# Patient Record
Sex: FEMALE | Race: WHITE | NOT HISPANIC OR LATINO | Employment: FULL TIME | URBAN - METROPOLITAN AREA
[De-identification: names, ages, dates, MRNs, and addresses within clinical notes are randomized per-mention and may not be internally consistent; named-entity substitution may affect disease eponyms.]

---

## 2022-05-04 ENCOUNTER — CLINICAL SUPPORT (OUTPATIENT)
Dept: OTHER | Facility: CLINIC | Age: 62
End: 2022-05-04
Payer: COMMERCIAL

## 2022-05-04 DIAGNOSIS — Z00.8 ENCOUNTER FOR OTHER GENERAL EXAMINATION: ICD-10-CM

## 2022-05-04 PROCEDURE — 99401 PR PREVENT COUNSEL,INDIV,15 MIN: ICD-10-PCS | Mod: S$GLB,,, | Performed by: INTERNAL MEDICINE

## 2022-05-04 PROCEDURE — 80061 PR  LIPID PANEL: ICD-10-PCS | Mod: QW,S$GLB,, | Performed by: INTERNAL MEDICINE

## 2022-05-04 PROCEDURE — 99401 PREV MED CNSL INDIV APPRX 15: CPT | Mod: S$GLB,,, | Performed by: INTERNAL MEDICINE

## 2022-05-04 PROCEDURE — 80061 LIPID PANEL: CPT | Mod: QW,S$GLB,, | Performed by: INTERNAL MEDICINE

## 2022-05-04 PROCEDURE — 82947 ASSAY GLUCOSE BLOOD QUANT: CPT | Mod: QW,S$GLB,, | Performed by: INTERNAL MEDICINE

## 2022-05-04 PROCEDURE — 82947 PR  ASSAY QUANTITATIVE,BLOOD GLUCOSE: ICD-10-PCS | Mod: QW,S$GLB,, | Performed by: INTERNAL MEDICINE

## 2022-05-05 VITALS
BODY MASS INDEX: 26.22 KG/M2 | WEIGHT: 148 LBS | DIASTOLIC BLOOD PRESSURE: 82 MMHG | SYSTOLIC BLOOD PRESSURE: 126 MMHG | HEIGHT: 63 IN

## 2022-05-05 LAB
HDLC SERPL-MCNC: 64 MG/DL
POC CHOLESTEROL, LDL (DOCK): 88.27 MG/DL
POC CHOLESTEROL, TOTAL: 170 MG/DL
POC GLUCOSE, FASTING: 97 MG/DL (ref 60–110)
TRIGL SERPL-MCNC: 98 MG/DL

## 2023-05-16 ENCOUNTER — OFFICE VISIT (OUTPATIENT)
Dept: UROGYNECOLOGY | Facility: CLINIC | Age: 63
End: 2023-05-16
Payer: COMMERCIAL

## 2023-05-16 VITALS
DIASTOLIC BLOOD PRESSURE: 74 MMHG | HEIGHT: 63 IN | SYSTOLIC BLOOD PRESSURE: 122 MMHG | BODY MASS INDEX: 23.77 KG/M2 | WEIGHT: 134.13 LBS

## 2023-05-16 DIAGNOSIS — N39.41 URGE INCONTINENCE: ICD-10-CM

## 2023-05-16 DIAGNOSIS — N81.11 CYSTOCELE, MIDLINE: Primary | ICD-10-CM

## 2023-05-16 DIAGNOSIS — R39.15 URINARY URGENCY: ICD-10-CM

## 2023-05-16 DIAGNOSIS — N81.9 VAGINAL VAULT PROLAPSE: ICD-10-CM

## 2023-05-16 LAB — POC RESIDUAL URINE VOLUME: 91 ML (ref 0–100)

## 2023-05-16 PROCEDURE — 99999 PR PBB SHADOW E&M-EST. PATIENT-LVL III: ICD-10-PCS | Mod: PBBFAC,,, | Performed by: OBSTETRICS & GYNECOLOGY

## 2023-05-16 PROCEDURE — 99999 PR PBB SHADOW E&M-EST. PATIENT-LVL III: CPT | Mod: PBBFAC,,, | Performed by: OBSTETRICS & GYNECOLOGY

## 2023-05-16 PROCEDURE — 1160F RVW MEDS BY RX/DR IN RCRD: CPT | Mod: CPTII,S$GLB,, | Performed by: OBSTETRICS & GYNECOLOGY

## 2023-05-16 PROCEDURE — 1159F PR MEDICATION LIST DOCUMENTED IN MEDICAL RECORD: ICD-10-PCS | Mod: CPTII,S$GLB,, | Performed by: OBSTETRICS & GYNECOLOGY

## 2023-05-16 PROCEDURE — 3074F SYST BP LT 130 MM HG: CPT | Mod: CPTII,S$GLB,, | Performed by: OBSTETRICS & GYNECOLOGY

## 2023-05-16 PROCEDURE — 3074F PR MOST RECENT SYSTOLIC BLOOD PRESSURE < 130 MM HG: ICD-10-PCS | Mod: CPTII,S$GLB,, | Performed by: OBSTETRICS & GYNECOLOGY

## 2023-05-16 PROCEDURE — 99204 PR OFFICE/OUTPT VISIT, NEW, LEVL IV, 45-59 MIN: ICD-10-PCS | Mod: S$GLB,,, | Performed by: OBSTETRICS & GYNECOLOGY

## 2023-05-16 PROCEDURE — 99204 OFFICE O/P NEW MOD 45 MIN: CPT | Mod: S$GLB,,, | Performed by: OBSTETRICS & GYNECOLOGY

## 2023-05-16 PROCEDURE — 3078F PR MOST RECENT DIASTOLIC BLOOD PRESSURE < 80 MM HG: ICD-10-PCS | Mod: CPTII,S$GLB,, | Performed by: OBSTETRICS & GYNECOLOGY

## 2023-05-16 PROCEDURE — 3008F PR BODY MASS INDEX (BMI) DOCUMENTED: ICD-10-PCS | Mod: CPTII,S$GLB,, | Performed by: OBSTETRICS & GYNECOLOGY

## 2023-05-16 PROCEDURE — 3008F BODY MASS INDEX DOCD: CPT | Mod: CPTII,S$GLB,, | Performed by: OBSTETRICS & GYNECOLOGY

## 2023-05-16 PROCEDURE — 3078F DIAST BP <80 MM HG: CPT | Mod: CPTII,S$GLB,, | Performed by: OBSTETRICS & GYNECOLOGY

## 2023-05-16 PROCEDURE — 1160F PR REVIEW ALL MEDS BY PRESCRIBER/CLIN PHARMACIST DOCUMENTED: ICD-10-PCS | Mod: CPTII,S$GLB,, | Performed by: OBSTETRICS & GYNECOLOGY

## 2023-05-16 PROCEDURE — 1159F MED LIST DOCD IN RCRD: CPT | Mod: CPTII,S$GLB,, | Performed by: OBSTETRICS & GYNECOLOGY

## 2023-05-16 RX ORDER — LINACLOTIDE 145 UG/1
145 CAPSULE, GELATIN COATED ORAL EVERY MORNING
COMMUNITY
Start: 2023-02-28

## 2023-05-16 NOTE — PROGRESS NOTES
Chief Complaint   Patient presents with    Consult    Vaginal Prolapse        HPI: Patient is a 63 y.o. female  who presents today stating that she has a transvaginal sling with mesh to prevent leakage of urine in  performed in [facility], [place], [place] Urologist. States that this last Saturday she strained a little and when she wiped she noticed a bulge. She reports that she mainly notices it when she is sitting on the toilet. It protrudes past the vaginal opening and is about a golf ball in size. She denies needing to splint with urination and BM's.     She reports that she as decreased sensation since her sling was placed. She states that she only has urinary urgency when she sits for a long period of time. She denies urinary frequency. She denies stress urinary incontinence with coughing, sneezing and laughing. May occasionally have urge incontinence. She denies nocturia and enuresis. She drinks water 70-80 ounces and one large cup of coffee (12 ounces).     She reports a h/o constipation. Takes Linzess and has been on the medication since about . BM's are regular as long as she takes the Linzess. She denies accidental bowel leakage.     Review of Systems   Constitutional:  Negative for activity change, appetite change, chills, fatigue, fever and unexpected weight change.   HENT:  Negative for nasal congestion, dental problem, hearing loss, mouth dryness and sore throat.    Eyes:  Negative for pain and eye dryness.   Respiratory:  Negative for cough, shortness of breath and wheezing.    Cardiovascular:  Negative for chest pain, palpitations and leg swelling.   Gastrointestinal:  Negative for abdominal distention, abdominal pain, blood in stool, constipation and rectal pain.   Endocrine: Negative for cold intolerance and heat intolerance.   Genitourinary:  Negative for dyspareunia, hot flashes and vaginal dryness.   Musculoskeletal:  Negative for arthralgias, back pain, gait problem,  joint swelling, myalgias, neck pain and neck stiffness.   Integumentary:  Negative for rash.   Neurological:  Negative for dizziness, tremors, seizures, speech difficulty, weakness, light-headedness, numbness and headaches.   Psychiatric/Behavioral:  Negative for confusion, dysphoric mood and sleep disturbance. The patient is not nervous/anxious.       Past Medical History:   Diagnosis Date    Diverticulitis        Past Surgical History:   Procedure Laterality Date    HYSTERECTOMY      with BSO    TUBAL LIGATION      vaginal sling      for ZA         Current Outpatient Medications:     LINZESS 145 mcg Cap capsule, Take 145 mcg by mouth every morning., Disp: , Rfl:     estrogens, conjugated, (PREMARIN) 1.25 MG tablet, Take 1.25 mg by mouth once daily., Disp: , Rfl:     oxycodone-acetaminophen 5-325 mg (PERCOCET) 5-325 mg per tablet, Take 1 tablet by mouth every 6 (six) hours as needed for Pain (breakthrough pain). (Patient not taking: Reported on 2023), Disp: 10 tablet, Rfl: 0    Review of patient's allergies indicates:  No Known Allergies    Family History   Problem Relation Age of Onset    Diabetes Mother     Hypertension Mother     Heart failure Father     Hypertension Sister     Hypertension Sister        Social History     Socioeconomic History    Marital status: Single   Occupational History    Occupation:    Tobacco Use    Smoking status: Never    Smokeless tobacco: Never   Substance and Sexual Activity    Alcohol use: Yes     Comment: occ    Drug use: Never    Sexual activity: Yes     Partners: Male   Social History Narrative    Lives with significant other, Carlton. Together since . Feels safe in her home and relationship.        OB History          3    Para   2    Term   2            AB   1    Living   2         SAB   1    IAB        Ectopic        Multiple        Live Births   2                 Gyn History    Mammogram: 23 pending  Pap smear: s/p hysterectomy  LMP:  No LMP recorded.   Postmenopausal bleeding: s/p hysterectomy      INITIAL PHYSICAL EXAMINATION    Vitals:    05/16/23 1337   BP: 122/74      General: Healthy in appearance, Well nourished, Affect Normal, NAD.  Neck: Supple, No masses, Trachea midline, Thyroid normal size,  non-tender, no masses or nodules.  Nodes: No clavicular/cervical adenopathy.  Skin: Normal temperature, No atypical lesions or rash.  Heart: Normal sounds, no murmurs  Lungs: Normal respiratory effort.  Gastrointestinal: Non tender, Non distended, No masses, guarding or  rebound, No hepatosplenomegally, No hernia.  Ext: No clubbing, cyanosis, edema or varicosities.  DTR's: 2+ bilaterally  Strength 5/5 bilateral upper and lower extremities    Genitourinary-  Vulva: normal without lesions, masses, atrophy or pain  Urethra: meatus central and normal in appearance, no prolapse/caruncle, no masses or discharge. Empty supine stress test was negative.  Bladder: non-tender, no masses  Vagina: No discharge or lesions, moderate atrophy, no masses appreciated.  [See POP-Q]  Cervix: absent  Uterus:  absent  Adnexa: no masses, non tender.  Rectal: deferred   Neuro: S2-4- pin prick and light touch intact, Anal wink present  Levator strength: 2/5  Levator tenderness: left 0/10 and right 0/10    POP-Q Exam- pelvic organ prolapse quantitative    Aa +1  Anterior Wall Ba +1  Anterior wall C -5  Cervix or cuff   Gh 5.5  Genital hiatus pb 3  perineal body tvl 10  Total vaginal length   Ap -2  Posterior wall Bp -2  Posterior wall D n/a  Posterior fornix     without Uterus    POP-Q Stage: 2      Office Visit on 05/16/2023   Component Date Value Ref Range Status    POC Residual Urine Volume 05/16/2023 91  0 - 100 mL Final        ASSESSMENT & PLAN:    Cystocele, midline  -     Ambulatory referral/consult to Physical/Occupational Therapy; Future; Expected date: 05/23/2023    Vaginal vault prolapse  -     Ambulatory referral/consult to Physical/Occupational Therapy; Future;  Expected date: 05/23/2023    Urge incontinence    Urinary urgency       Exam findings and treatment options were discussed in detail with the patient. Her symptoms and exam findings are consistent with urge urinary incontinence and pelvic organ prolapse. We discussed various surgical and nonsurgical management options including pessary use, pelvic floor rehabilitation, and reconstructive pelvic surgery. At this time she would prefer a nonsurgical treatment approach and I therefore started her on a conservative regimen as above with pelvic floor PT. She may consider a pessary. Reviewed risk factors for worsening of symptoms such as constipation and straining. She'll be scheduled for a followup appointment at which time further treatment options will be considered if there is not significant improvement in her symptoms. Will call for a sooner appointment if she opts to proceed with a pessary fitting. Out of pocket cost reviewed with her.     All questions were answered today. The patient was encouraged to contact the office as needed with any additional questions or concerns.     Total time spent on visit was 45 minutes.  This includes face to face time and non-face to face time preparing to see the patient (eg, review of tests), Obtaining and/or reviewing separately obtained history, Documenting clinical information in the electronic or other health record, Independently interpreting resultsand communicating results to the patient/family/caregiver, or Care coordination.    Glenna Miguel MD

## 2023-06-01 ENCOUNTER — CLINICAL SUPPORT (OUTPATIENT)
Dept: REHABILITATION | Facility: HOSPITAL | Age: 63
End: 2023-06-01
Attending: OBSTETRICS & GYNECOLOGY
Payer: COMMERCIAL

## 2023-06-01 DIAGNOSIS — R27.9 LACK OF COORDINATION: ICD-10-CM

## 2023-06-01 DIAGNOSIS — N81.89 PELVIC FLOOR WEAKNESS: Primary | ICD-10-CM

## 2023-06-01 DIAGNOSIS — N81.11 CYSTOCELE, MIDLINE: ICD-10-CM

## 2023-06-01 DIAGNOSIS — N81.9 VAGINAL VAULT PROLAPSE: ICD-10-CM

## 2023-06-01 PROCEDURE — 97162 PT EVAL MOD COMPLEX 30 MIN: CPT | Mod: PN

## 2023-06-01 PROCEDURE — 97112 NEUROMUSCULAR REEDUCATION: CPT | Mod: PN

## 2023-06-01 NOTE — PROGRESS NOTES
[facility]   Pelvic Health Physical Therapy Initial Evaluation     Date: 2023   Name: Nicolasa Bean  [facility] Number: [id number]    Therapy Diagnosis:   Encounter Diagnoses   Name Primary?    Cystocele, midline     Vaginal vault prolapse     Pelvic floor weakness Yes    Lack of coordination      Physician: Glenna Miguel MD    Physician Orders: PT Eval and Treat   Medical Diagnosis from Referral: cystocele, midline and vaginal vault prolapse  Evaluation Date: 2023  Authorization Period Expiration: 2023  Plan of Care Expiration: 2023  Progress Note Due: 2023  Visit # / Visits authorized:    FOTO: Issued Visit #: 1     Precautions: Standard    Time In: 11:14  Time Out: 12:20  Total Appointment Time (timed & untimed codes): 66 minutes    SUBJECTIVE     Date of onset: a few weeks ago on Mother's day weekend.     History of current condition - Nicolasa reports: she is having bladder prolapse. Has bulging outside of vaginal opening with going to the bathroom. Is planning to have fixation surgery at the end of the year. Does have a bladder sling that was placed in  and has had a hysterectomy. Since the sling surgery has decreased sensation and has difficulty knowing when she needs to void. Urgency is all or nothing, not able to notice partial fullness. First noticed the prolapse with attempting to have a BM and was bearing down. Has L4-L5 disc issues    OB/GYN History: . Both vaginal births.   Sexually active? Yes  Pain with vaginal exams, intercourse or tampon use? No    Bladder/Bowel History:   Frequency of urination:   Daytime: 5-6 times            Nighttime: none  Difficulty initiating urine stream: No  Urine stream: strong  Complete emptying: No, has not since bladder sling in .   Bladder leakage: Yes, with urgency and prolonged waiting over 4 hours.   Frequency of incidents: not regularly  Amount leaked (urine): few drops and teaspoon(s)  Urinary Urgency:  "Yes, Able to delay the urge for at least a few minute(s).  Frequency of bowel movements: once a day, maybe twice.   Difficulty initiating BM: Yes, sometimes.   Quality/Shape of BM:  starts as firm and then progresses to loose  Does Patient Feel Empty after BM? Yes  Fiber Supplements or Laxative Use? Yes, is taking linzess. Has been taking it for a few years. Has IBS with constipation and diverticulitis.   Colon leakage: No  Frequency of incidents:  none  Amount leaked (bowels):  none  Form of protection: none  Number of pads required in 24 hours: 0    Pain:  Location: started having lower abdominal pain starting yesterday.   Current 0/10, worst 2/10, best 0/10   Description: pressure    Reports feeling a constant pelvic pressure and heaviness. Is only having prolapse bulging with pushing to go to the bathroom.     Prior Therapy: not for this condition  Social History:  lives with their spouse  Current exercise: not currently due to fear of prolapse  Occupation: Patient works  Prior Level of Function: independent  Current Level of Function: independent    Types of fluid intake: 28 ounces of water after taking linzess, 24-30 ounces during the day, a few 16oz bottles at the end of the day, 1 cup of coffee every morning, wine   Diet: no major restrictions, eats a whole diet   Habitus: well developed, well nourished  Abuse/Neglect: No     Patients goals: "get bladder to go back to where it's supposed to be, have exercises to get to the place to have my surgery"     Medical History: Nicolasa  has a past medical history of Diverticulitis.     Surgical History: Nicolasa Bean  has a past surgical history that includes Hysterectomy (2004); Tubal ligation; and vaginal sling.    Medications: Nicolasa has a current medication list which includes the following prescription(s): estrogens (conjugated), linzess, and oxycodone-acetaminophen.    Allergies: Review of patient's allergies indicates:  No Known Allergies     OBJECTIVE     See " EMR under MEDIA for written consent provided 6/1/2023  Chaperone: declined    ORTHO SCREEN  Posture in sitting: sits squarely    Posture in standing: WNL  Pelvic alignment: Not assessed today    SI Joint Palpation: Denies tenderness to SI joint palpation bilaterally.  Sacral spring test: NT (Positive=NO spring)  Adductor Palpation: increased tension     ABDOMINAL WALL ASSESSMENT  Palpation: increased tension to right side near right ASIS  Abdominal strength: Rectus abdominus: 3/5     Transverse abdominus: 3/5  Scarring: none  Pelvic Floor Muscle and Transverse Abdominus Synergy: absent  Diastasis: absent     BREATHING MECHANICS ASSESSMENT   Thorax Assessment During Quiet Respiration: Decreased excursion of abdominal wall   Thorax Assessment During Deep Respiration: Decreased excursion of abdominal wall  and Excessive excursion of lower ribs    VAGINAL PELVIC FLOOR EXAM    EXTERNAL ASSESSMENT  Introitus: gaping  Skin condition: WNL  Scarring: none   Sensation: numbness noted   Pain: none      INTERNAL ASSESSMENT  Pain: tender areas noted as follows: left bulbocavernosus referral to left ASIS and bladder   Sensation: numbness noted throughout    Vaginal vault: roomy   Muscle Bulk: atrophy -1  Muscle Power: 2/5    Quality of contraction: decreased hold and slow relaxation   Specificity: patient contracts: more posterior musculature   Coordination: tends to hold breath during PFM contration   Prolapse check: Grade 2 cystocele in supine      Limitation/Restriction for FOTO Survey    Therapist reviewed FOTO scores for Nicolasa Bean on 6/1/2023.   FOTO documents entered into [employer] - see Media section.    Limitation Score: PFDI Prolapse 8%       TREATMENT     Total Treatment time (time-based codes) separate from Evaluation: 40 minutes     Neuromuscular Re-education to improve Coordination and Control for 40 minutes including:   pelvic floor relaxation/bulging training, diaphragmatic breathing with Kegel, and diaphragmatic  breathing      PATIENT EDUCATION AND HOME EXERCISES     Education provided:   general anatomy/physiology of urinary/ bowel  system and benefits of treatment were discussed with the patient. Additionally, bladder irritants, anatomy/physiology of pelvic floor, bladder retraining, diaphragmatic breathing, kegels, fluid intake/dietary modifications, and behavior modifications were reviewed.     Written Home Exercises provided: yes.  Exercises were reviewed and Nicolasa was able to demonstrate them prior to the end of the session. Nicolasa demonstrated good  understanding of the education provided. See EMR under Patient Instructions for exercises provided during therapy sessions.    ASSESSMENT     Nicolasa is a 63 y.o. female referred to outpatient Physical Therapy with a medical diagnosis of cystocele, midline and vaginal vault prolapse. Pt presents with decreased pelvic floor strength and impaired motor coordination.     Patient prognosis is Good.   Patient will benefit from skilled outpatient Physical Therapy to address the deficits stated above and in the chart below, provide patient/family education, and to maximize patient's level of independence.     Plan of care discussed with patient: Yes  Patient's spiritual, cultural and educational needs considered and patient is agreeable to the plan of care and goals as stated below:     Anticipated Barriers for therapy: scheduling    Medical Necessity is demonstrated by the following:  History  Co-morbidities and personal factors that may impact the plan of care Co-morbidities   prior abdominal surgery and prior pelvic surgery    Personal Factors  no deficits     moderate   Examination  Body structures and functions, activity limitations and participation restrictions that may impact the plan of care Body Regions/Systems/Functions:  decreased pelvic muscle strength, decreased endurance of the pelvic muscles, decreased phasic ability of the pelvic muscles, poor quality of pelvic  muscle contraction, poor coordination of pelvic floor muscles during ADL's leading to urinary or fecal leakage, dysfunctional voiding, dysfunctional defecation, and unable to co-contract or co-relax abdominal wall and pelvic floor muscles     Activity Limitations:  delaying urge to urinate, delaying urge to urinate or have a BM, bearing down for BM, and incontinence with ADLs    Participation Restrictions:  all ADLs/iADLs uninterrupted by urinary incontinence/urgency/frequency, regularly having a comfortable BM, and Pelvic pressure with ADLs.     Activity limitations:   Learning and applying knowledge  no deficits    General Tasks and Commands  no deficits    Communication  no deficits    Mobility  no deficits    Self care  no deficits    Domestic Life  no deficits    Interactions/Relationships  no deficits    Life Areas  no deficits    Community and Social Life  no deficits       moderate   Clinical Presentation evolving clinical presentation with changing clinical characteristics moderate   Decision Making/ Complexity Score: moderate       Goals:  Short Term Goals: 6 weeks   - Pt will demonstrate excellent knowledge and adherence to HEP to facilitate optimal recovery.  - Pt will demonstrate proper PFM contraction, relaxation, and lengthening coordinated with TA and breath for improved muscle coordination needed for functional activity.    Long Term Goals: 12 weeks   - Pt will demonstrate excellent knowledge and adherence to HEP for continued self-maintenance of symptoms.  - Pt will report improvement of prolapse symptoms by 50%.  - Pt will report voiding interval of 2-3 hours for improved ADL tolerance.  - Pt will report no incidence of urinary or fecal incontinence 7/7 days for improved hygiene and ADL/IADL tolerance.   - Pt will demonstrate PFM strength of at least 3/5 MMT for improved strength needed to maintain continence.   - Pt will report bearing down appropriately 100% of the time for improved bowel  function and decreased stress on adjacent pelvic structures.       PLAN     Plan of care Certification: 6/1/2023 to 9/1/2023.    Outpatient Physical Therapy 1-2 time(s) every 1 week(s) for 8-12 weeks to include the following interventions: Manual Therapy, Moist Heat/ Ice, Neuromuscular Re-ed, Patient Education, Therapeutic Activities, and Therapeutic Exercise.     Stephanie Zazueta, PT      I CERTIFY THE NEED FOR THESE SERVICES FURNISHED UNDER THIS PLAN OF TREATMENT AND WHILE UNDER MY CARE   Physician's comments:     Physician's Signature: ___________________________________________________

## 2023-06-02 PROBLEM — N81.11 CYSTOCELE, MIDLINE: Status: ACTIVE | Noted: 2023-06-02

## 2023-06-02 PROBLEM — N81.89 PELVIC FLOOR WEAKNESS: Status: ACTIVE | Noted: 2023-06-02

## 2023-06-02 PROBLEM — N81.9 VAGINAL VAULT PROLAPSE: Status: ACTIVE | Noted: 2023-06-02

## 2023-06-02 PROBLEM — R27.9 LACK OF COORDINATION: Status: ACTIVE | Noted: 2023-06-02

## 2023-06-02 NOTE — PLAN OF CARE
[facility]   Pelvic Health Physical Therapy Initial Evaluation     Date: 2023   Name: Nicolasa Bean  [facility] Number: [id number]    Therapy Diagnosis:   Encounter Diagnoses   Name Primary?    Cystocele, midline     Vaginal vault prolapse     Pelvic floor weakness Yes    Lack of coordination      Physician: Glenna Miguel MD    Physician Orders: PT Eval and Treat   Medical Diagnosis from Referral: cystocele, midline and vaginal vault prolapse  Evaluation Date: 2023  Authorization Period Expiration: 2023  Plan of Care Expiration: 2023  Progress Note Due: 2023  Visit # / Visits authorized:    FOTO: Issued Visit #: 1     Precautions: Standard    Time In: 11:14  Time Out: 12:20  Total Appointment Time (timed & untimed codes): 66 minutes    SUBJECTIVE     Date of onset: a few weeks ago on Mother's day weekend.     History of current condition - Nicolasa reports: she is having bladder prolapse. Has bulging outside of vaginal opening with going to the bathroom. Is planning to have fixation surgery at the end of the year. Does have a bladder sling that was placed in  and has had a hysterectomy. Since the sling surgery has decreased sensation and has difficulty knowing when she needs to void. Urgency is all or nothing, not able to notice partial fullness. First noticed the prolapse with attempting to have a BM and was bearing down. Has L4-L5 disc issues    OB/GYN History: . Both vaginal births.   Sexually active? Yes  Pain with vaginal exams, intercourse or tampon use? No    Bladder/Bowel History:   Frequency of urination:   Daytime: 5-6 times            Nighttime: none  Difficulty initiating urine stream: No  Urine stream: strong  Complete emptying: No, has not since bladder sling in .   Bladder leakage: Yes, with urgency and prolonged waiting over 4 hours.   Frequency of incidents: not regularly  Amount leaked (urine): few drops and teaspoon(s)  Urinary Urgency:  "Yes, Able to delay the urge for at least a few minute(s).  Frequency of bowel movements: once a day, maybe twice.   Difficulty initiating BM: Yes, sometimes.   Quality/Shape of BM: starts as firm and then progresses to loose  Does Patient Feel Empty after BM? Yes  Fiber Supplements or Laxative Use? Yes, is taking linzess. Has been taking it for a few years. Has IBS with constipation and diverticulitis.   Colon leakage: No  Frequency of incidents:  none  Amount leaked (bowels): none  Form of protection: none  Number of pads required in 24 hours: 0    Pain:  Location: started having lower abdominal pain starting yesterday.   Current 0/10, worst 2/10, best 0/10   Description: pressure    Reports feeling a constant pelvic pressure and heaviness. Is only having prolapse bulging with pushing to go to the bathroom.     Prior Therapy: not for this condition  Social History:  lives with their spouse  Current exercise: not currently due to fear of prolapse  Occupation: Patient works  Prior Level of Function: independent  Current Level of Function: independent    Types of fluid intake: 28 ounces of water after taking linzess, 24-30 ounces during the day, a few 16oz bottles at the end of the day, 1 cup of coffee every morning, wine   Diet: no major restrictions, eats a whole diet   Habitus: well developed, well nourished  Abuse/Neglect: No     Patients goals: "get bladder to go back to where it's supposed to be, have exercises to get to the place to have my surgery"     Medical History: Nicolasa  has a past medical history of Diverticulitis.     Surgical History: Nicolasa Bean  has a past surgical history that includes Hysterectomy (2004); Tubal ligation; and vaginal sling.    Medications: Nicolasa has a current medication list which includes the following prescription(s): estrogens (conjugated), linzess, and oxycodone-acetaminophen.    Allergies: Review of patient's allergies indicates:  No Known Allergies     OBJECTIVE     See EMR " under MEDIA for written consent provided 6/1/2023  Chaperone: declined    ORTHO SCREEN  Posture in sitting: sits squarely    Posture in standing: WNL  Pelvic alignment: Not assessed today    SI Joint Palpation: Denies tenderness to SI joint palpation bilaterally.  Sacral spring test: NT (Positive=NO spring)  Adductor Palpation: increased tension     ABDOMINAL WALL ASSESSMENT  Palpation: increased tension to right side near right ASIS  Abdominal strength: Rectus abdominus: 3/5     Transverse abdominus: 3/5  Scarring: none  Pelvic Floor Muscle and Transverse Abdominus Synergy: absent  Diastasis: absent     BREATHING MECHANICS ASSESSMENT   Thorax Assessment During Quiet Respiration: Decreased excursion of abdominal wall   Thorax Assessment During Deep Respiration: Decreased excursion of abdominal wall  and Excessive excursion of lower ribs    VAGINAL PELVIC FLOOR EXAM    EXTERNAL ASSESSMENT  Introitus: gaping  Skin condition: WNL  Scarring: none   Sensation: numbness noted   Pain: none      INTERNAL ASSESSMENT  Pain: tender areas noted as follows: left bulbocavernosus referral to left ASIS and bladder   Sensation: numbness noted throughout   Vaginal vault: roomy   Muscle Bulk: atrophy -1  Muscle Power: 2/5    Quality of contraction: decreased hold and slow relaxation   Specificity: patient contracts: more posterior musculature   Coordination: tends to hold breath during PFM contration   Prolapse check: Grade 2 cystocele in supine      Limitation/Restriction for FOTO Survey    Therapist reviewed FOTO scores for Nicolasa Bean on 6/1/2023.   FOTO documents entered into [employer] - see Media section.    Limitation Score: PFDI Prolapse 8%       TREATMENT     Total Treatment time (time-based codes) separate from Evaluation: 40 minutes     Neuromuscular Re-education to improve Coordination and Control for 40 minutes including:   pelvic floor relaxation/bulging training, diaphragmatic breathing with Kegel, and diaphragmatic  breathing      PATIENT EDUCATION AND HOME EXERCISES     Education provided:   general anatomy/physiology of urinary/ bowel  system and benefits of treatment were discussed with the patient. Additionally, bladder irritants, anatomy/physiology of pelvic floor, bladder retraining, diaphragmatic breathing, kegels, fluid intake/dietary modifications, and behavior modifications were reviewed.     Written Home Exercises provided: yes.  Exercises were reviewed and Nicolasa was able to demonstrate them prior to the end of the session. Nicolasa demonstrated good  understanding of the education provided. See EMR under Patient Instructions for exercises provided during therapy sessions.    ASSESSMENT     Nicolasa is a 63 y.o. female referred to outpatient Physical Therapy with a medical diagnosis of cystocele, midline and vaginal vault prolapse. Pt presents with decreased pelvic floor strength and impaired motor coordination.     Patient prognosis is Good.   Patient will benefit from skilled outpatient Physical Therapy to address the deficits stated above and in the chart below, provide patient/family education, and to maximize patient's level of independence.     Plan of care discussed with patient: Yes  Patient's spiritual, cultural and educational needs considered and patient is agreeable to the plan of care and goals as stated below:     Anticipated Barriers for therapy: scheduling    Medical Necessity is demonstrated by the following:  History  Co-morbidities and personal factors that may impact the plan of care Co-morbidities   prior abdominal surgery and prior pelvic surgery    Personal Factors  no deficits     moderate   Examination  Body structures and functions, activity limitations and participation restrictions that may impact the plan of care Body Regions/Systems/Functions:  decreased pelvic muscle strength, decreased endurance of the pelvic muscles, decreased phasic ability of the pelvic muscles, poor quality of pelvic  muscle contraction, poor coordination of pelvic floor muscles during ADL's leading to urinary or fecal leakage, dysfunctional voiding, dysfunctional defecation, and unable to co-contract or co-relax abdominal wall and pelvic floor muscles     Activity Limitations:  delaying urge to urinate, delaying urge to urinate or have a BM, bearing down for BM, and incontinence with ADLs    Participation Restrictions:  all ADLs/iADLs uninterrupted by urinary incontinence/urgency/frequency, regularly having a comfortable BM, and Pelvic pressure with ADLs.     Activity limitations:   Learning and applying knowledge  no deficits    General Tasks and Commands  no deficits    Communication  no deficits    Mobility  no deficits    Self care  no deficits    Domestic Life  no deficits    Interactions/Relationships  no deficits    Life Areas  no deficits    Community and Social Life  no deficits       moderate   Clinical Presentation evolving clinical presentation with changing clinical characteristics moderate   Decision Making/ Complexity Score: moderate       Goals:  Short Term Goals: 6 weeks   - Pt will demonstrate excellent knowledge and adherence to HEP to facilitate optimal recovery.  - Pt will demonstrate proper PFM contraction, relaxation, and lengthening coordinated with TA and breath for improved muscle coordination needed for functional activity.    Long Term Goals: 12 weeks   - Pt will demonstrate excellent knowledge and adherence to HEP for continued self-maintenance of symptoms.  - Pt will report improvement of prolapse symptoms by 50%.  - Pt will report voiding interval of 2-3 hours for improved ADL tolerance.  - Pt will report no incidence of urinary or fecal incontinence 7/7 days for improved hygiene and ADL/IADL tolerance.   - Pt will demonstrate PFM strength of at least 3/5 MMT for improved strength needed to maintain continence.   - Pt will report bearing down appropriately 100% of the time for improved bowel  function and decreased stress on adjacent pelvic structures.       PLAN     Plan of care Certification: 6/1/2023 to 9/1/2023.    Outpatient Physical Therapy 1-2 time(s) every 1 week(s) for 8-12 weeks to include the following interventions: Manual Therapy, Moist Heat/ Ice, Neuromuscular Re-ed, Patient Education, Therapeutic Activities, and Therapeutic Exercise.     Stephanie Zazueta, PT      I CERTIFY THE NEED FOR THESE SERVICES FURNISHED UNDER THIS PLAN OF TREATMENT AND WHILE UNDER MY CARE   Physician's comments:     Physician's Signature: ___________________________________________________

## 2023-06-02 NOTE — PATIENT INSTRUCTIONS
Kegels - start this laying down so that your pelvic floor doesn't have to work against gravity. Start by taking a deep breath in, then as you exhale draw your pelvic floor closed and lift like you were stopping the flow of urine (close the openings). Hold this contraction at 50-60% of your full force as you exhale. Release to a fully relaxed position. Exhale like you are blowing out birthday candles while performing a Kegel to avoid holding your breath and increasing pressure on your abdomen/pelvic floor  Perform 3 sets of 10 reps, daily    No Kegels while urinating!

## 2023-07-12 ENCOUNTER — CLINICAL SUPPORT (OUTPATIENT)
Dept: REHABILITATION | Facility: HOSPITAL | Age: 63
End: 2023-07-12
Attending: OBSTETRICS & GYNECOLOGY
Payer: COMMERCIAL

## 2023-07-12 DIAGNOSIS — N81.9 VAGINAL VAULT PROLAPSE: Primary | ICD-10-CM

## 2023-07-12 DIAGNOSIS — N81.89 PELVIC FLOOR WEAKNESS: ICD-10-CM

## 2023-07-12 DIAGNOSIS — N81.11 CYSTOCELE, MIDLINE: ICD-10-CM

## 2023-07-12 DIAGNOSIS — R27.9 LACK OF COORDINATION: ICD-10-CM

## 2023-07-12 PROCEDURE — 97530 THERAPEUTIC ACTIVITIES: CPT | Mod: PN

## 2023-07-12 NOTE — PROGRESS NOTES
Pelvic Health Physical Therapy   Treatment Note     Name: Nicolasa Bean  Clinic Number: [id number]    Therapy Diagnosis:   Encounter Diagnoses   Name Primary?    Vaginal vault prolapse Yes    Pelvic floor weakness     Cystocele, midline     Lack of coordination      Physician: Glenna Miguel MD    Visit Date: 7/12/2023    Physician Orders: Physical Therapy evaluate and treat  Medical Diagnosis: cystocele, midline and vaginal vault prolapse  Evaluation Date: 6/1/2023  Authorization Period Expiration: 12/31/2023  Plan of Care Certification Period: 9/1/2023  Visit #/Visits authorized: 1/ 20 (+eval)       Time In: 9:04  Time Out: 9:36  Total Billable Time: 32 minutes    Precautions: Standard    Subjective     Pt reports: Has not been seen since initial evaluation on 6/1/2023. Has noticed that later in the day the prolapse feels worse. Also notices the pressure with going to the bathroom. Is still planning to have fixation surgery later this year.     She was compliant with home exercise program.  Response to previous treatment: good  Functional change: no change    Pain: 0/10    Objective     No objective measures taken today.    Treatment     Nicolasa participated in dynamic functional therapeutic activities to improve functional performance for 32  minutes, including:  Kegels with functional performance: sit<>stand with body weight and with 15 lbs kettle bell, reverse dead lift with body weight and 15 lb kettle bell, kegels with jumps and jump squats, transverse abdominis bracing       Home Exercises Provided and Patient Education Provided     Education provided:   - anatomy/physiology of pelvic floor, posture/body mechanices, diaphragmatic breathing, isometric abdominal exercises, kegels, proper bearing down techniques, and behavior modifications  Discussed progression of plan of care with patient; educated pt in activity modification; reviewed HEP with pt. Pt demonstrated and verbalized understanding of all instruction  and was provided with a handout of HEP (see Patient Instructions).  - diaphragmatic breathing  - kegels x30 + functional activity      Written Home Exercises Provided: yes.  Exercises were reviewed and Nicolasa was able to demonstrate them prior to the end of the session.  Nicolasa demonstrated good  understanding of the education provided.     See EMR under Patient Instructions for exercises provided 7/12/2023.    Assessment     Patient tolerated treatment well with no adverse reactions. Patient reported increased difficulty with maintaining kegel with sit<>stand with body weight and has less difficulty with holding a 15lb weight.     Nicolasa Is not progressing well towards her goals.   Pt prognosis is Fair.     Pt will continue to benefit from skilled outpatient physical therapy to address the deficits listed in the problem list box on initial evaluation, provide pt/family education and to maximize pt's level of independence in the home and community environment.     Pt's spiritual, cultural and educational needs considered and pt agreeable to plan of care and goals.     Anticipated barriers to physical therapy: scheduling    Goals:   Short Term Goals: 6 weeks   - Pt will demonstrate excellent knowledge and adherence to HEP to facilitate optimal recovery.  - Pt will demonstrate proper PFM contraction, relaxation, and lengthening coordinated with TA and breath for improved muscle coordination needed for functional activity.     Long Term Goals: 12 weeks   - Pt will demonstrate excellent knowledge and adherence to HEP for continued self-maintenance of symptoms.  - Pt will report improvement of prolapse symptoms by 50%.  - Pt will report voiding interval of 2-3 hours for improved ADL tolerance.  - Pt will report no incidence of urinary or fecal incontinence 7/7 days for improved hygiene and ADL/IADL tolerance.   - Pt will demonstrate PFM strength of at least 3/5 MMT for improved strength needed to maintain continence.   - Pt  will report bearing down appropriately 100% of the time for improved bowel function and decreased stress on adjacent pelvic structures.     Plan     Plan to continue current plan of care and progress as tolerated.     Stephanie Zazueta, PT

## 2023-07-12 NOTE — PATIENT INSTRUCTIONS
Dio Baldwin,    Here is the rundown of your next set of exercises.     Kegel with sit to stand. Start sitting down in a standard chair, contract your pelvic floor muscles. Stand up and then sit back down while keeping pelvic floor engaged. Start with a 10 lb pound weight. Repeat x20.  Kegel with squat/lift. Holding a 15 lb weight, hinge forward at your hips and lower the weight to the ground, let go of the weight and return to standing. Hinge back forward to  the weight again. Maintain a pelvic floor contraction the entire time up and back down. Repeat x20.  Kegel with jump. Start by irma pelvic floor while standing, hold this contraction while you jump up and as your feet hit the ground. Repeat x20.

## 2023-07-20 ENCOUNTER — CLINICAL SUPPORT (OUTPATIENT)
Dept: REHABILITATION | Facility: HOSPITAL | Age: 63
End: 2023-07-20
Attending: OBSTETRICS & GYNECOLOGY
Payer: COMMERCIAL

## 2023-07-20 DIAGNOSIS — N81.11 CYSTOCELE, MIDLINE: ICD-10-CM

## 2023-07-20 DIAGNOSIS — N81.89 PELVIC FLOOR WEAKNESS: ICD-10-CM

## 2023-07-20 DIAGNOSIS — R27.9 LACK OF COORDINATION: ICD-10-CM

## 2023-07-20 DIAGNOSIS — N81.9 VAGINAL VAULT PROLAPSE: Primary | ICD-10-CM

## 2023-07-20 PROCEDURE — 97530 THERAPEUTIC ACTIVITIES: CPT | Mod: PN

## 2023-07-20 NOTE — PROGRESS NOTES
Pelvic Health Physical Therapy   Treatment Note and Discharge Note     Name: Nicolasa Bean  Clinic Number: [id number]    Therapy Diagnosis:   Encounter Diagnoses   Name Primary?    Vaginal vault prolapse Yes    Pelvic floor weakness     Cystocele, midline     Lack of coordination      Physician: Glenna Miguel MD    Visit Date: 7/20/2023    Physician Orders: Physical Therapy evaluate and treat  Medical Diagnosis: cystocele, midline and vaginal vault prolapse  Evaluation Date: 6/1/2023  Authorization Period Expiration: 12/31/2023  Plan of Care Certification Period: 9/1/2023  Visit #/Visits authorized: 2/ 20 (+eval)       Time In: 11:06  Time Out: 11:41  Total Billable Time: 35 minutes    Precautions: Standard    Subjective     Pt reports: Went on vacation and was not as compliant with HEP. Is wanting DC at this time. Would like a full HEP and progressions to do at home. Will still have increased prolapse symptoms later in the day. No adverse reaction after last session.       She was compliant with home exercise program.  Response to previous treatment: good  Functional change: no change    Pain: 0/10    Objective     No objective measures taken today.    Treatment     Nicolasa participated in dynamic functional therapeutic activities to improve functional performance for 35 minutes, including:  HEP performance and progresssions for after discharge.     Home Exercises Provided and Patient Education Provided     Education provided:   - anatomy/physiology of pelvic floor, posture/body mechanices, diaphragmatic breathing, isometric abdominal exercises, kegels, proper bearing down techniques, and behavior modifications  Discussed progression of plan of care with patient; educated pt in activity modification; reviewed HEP with pt. Pt demonstrated and verbalized understanding of all instruction and was provided with a handout of HEP (see Patient Instructions).  - diaphragmatic breathing        Written Home Exercises Provided:  yes.  Exercises were reviewed and Nicolasa was able to demonstrate them prior to the end of the session.  Nicolasa demonstrated good  understanding of the education provided.     See EMR under Patient Instructions for exercises provided 7/12/2023.    Assessment     Patient tolerated treatment well with no adverse reactions.     Nicolasa Is not progressing well towards her goals.   Pt prognosis is Fair.     Pt's spiritual, cultural and educational needs considered and pt agreeable to plan of care and goals.     Anticipated barriers to physical therapy: scheduling    Goals:   Short Term Goals: 6 weeks   - Pt will demonstrate excellent knowledge and adherence to HEP to facilitate optimal recovery.  - Pt will demonstrate proper PFM contraction, relaxation, and lengthening coordinated with TA and breath for improved muscle coordination needed for functional activity.    Long Term Goals: 12 weeks   - Pt will demonstrate excellent knowledge and adherence to HEP for continued self-maintenance of symptoms.  - Pt will report improvement of prolapse symptoms by 50%.  - Pt will report voiding interval of 2-3 hours for improved ADL tolerance.  - Pt will report no incidence of urinary or fecal incontinence 7/7 days for improved hygiene and ADL/IADL tolerance.   - Pt will demonstrate PFM strength of at least 3/5 MMT for improved strength needed to maintain continence.   - Pt will report bearing down appropriately 100% of the time for improved bowel function and decreased stress on adjacent pelvic structures.     Plan     Plan to discharge to independent home exercise program.      Stephanie Zazueta, PT

## 2023-07-21 PROBLEM — N81.89 PELVIC FLOOR WEAKNESS: Status: RESOLVED | Noted: 2023-06-02 | Resolved: 2023-07-21

## 2023-07-21 PROBLEM — R27.9 LACK OF COORDINATION: Status: RESOLVED | Noted: 2023-06-02 | Resolved: 2023-07-21

## 2023-07-21 PROBLEM — N81.11 CYSTOCELE, MIDLINE: Status: RESOLVED | Noted: 2023-06-02 | Resolved: 2023-07-21

## 2023-07-21 PROBLEM — N81.9 VAGINAL VAULT PROLAPSE: Status: RESOLVED | Noted: 2023-06-02 | Resolved: 2023-07-21

## 2023-07-21 NOTE — PATIENT INSTRUCTIONS
Hi Nicolasa!    Here is your exercise program and all of the progressions. I have attached pictures of the ones I could find at the end. Let me know if you have any questions. Good luck going forward, I have enjoyed working with you.     Deep breathing for 3-5 minutes daily  Kegels, x15 of each type. Try them sitting and standing up  Regular kegels  Elevators, 3-step  Quick flicks  10 second hold  Kegel hold with a cough

## 2023-09-13 ENCOUNTER — OFFICE VISIT (OUTPATIENT)
Dept: UROGYNECOLOGY | Facility: CLINIC | Age: 63
End: 2023-09-13
Payer: COMMERCIAL

## 2023-09-13 VITALS
WEIGHT: 147 LBS | SYSTOLIC BLOOD PRESSURE: 170 MMHG | BODY MASS INDEX: 26.04 KG/M2 | HEART RATE: 62 BPM | DIASTOLIC BLOOD PRESSURE: 76 MMHG

## 2023-09-13 DIAGNOSIS — N39.41 URGE INCONTINENCE: ICD-10-CM

## 2023-09-13 DIAGNOSIS — N81.11 CYSTOCELE, MIDLINE: ICD-10-CM

## 2023-09-13 DIAGNOSIS — N99.3 VAGINAL VAULT PROLAPSE AFTER HYSTERECTOMY: Primary | ICD-10-CM

## 2023-09-13 DIAGNOSIS — N81.6 RECTOCELE: ICD-10-CM

## 2023-09-13 PROCEDURE — 1160F PR REVIEW ALL MEDS BY PRESCRIBER/CLIN PHARMACIST DOCUMENTED: ICD-10-PCS | Mod: CPTII,S$GLB,, | Performed by: OBSTETRICS & GYNECOLOGY

## 2023-09-13 PROCEDURE — 3077F PR MOST RECENT SYSTOLIC BLOOD PRESSURE >= 140 MM HG: ICD-10-PCS | Mod: CPTII,S$GLB,, | Performed by: OBSTETRICS & GYNECOLOGY

## 2023-09-13 PROCEDURE — 99214 PR OFFICE/OUTPT VISIT, EST, LEVL IV, 30-39 MIN: ICD-10-PCS | Mod: S$GLB,,, | Performed by: OBSTETRICS & GYNECOLOGY

## 2023-09-13 PROCEDURE — 99999 PR PBB SHADOW E&M-EST. PATIENT-LVL III: ICD-10-PCS | Mod: PBBFAC,,, | Performed by: OBSTETRICS & GYNECOLOGY

## 2023-09-13 PROCEDURE — 99214 OFFICE O/P EST MOD 30 MIN: CPT | Mod: S$GLB,,, | Performed by: OBSTETRICS & GYNECOLOGY

## 2023-09-13 PROCEDURE — 1159F PR MEDICATION LIST DOCUMENTED IN MEDICAL RECORD: ICD-10-PCS | Mod: CPTII,S$GLB,, | Performed by: OBSTETRICS & GYNECOLOGY

## 2023-09-13 PROCEDURE — 3008F PR BODY MASS INDEX (BMI) DOCUMENTED: ICD-10-PCS | Mod: CPTII,S$GLB,, | Performed by: OBSTETRICS & GYNECOLOGY

## 2023-09-13 PROCEDURE — 3008F BODY MASS INDEX DOCD: CPT | Mod: CPTII,S$GLB,, | Performed by: OBSTETRICS & GYNECOLOGY

## 2023-09-13 PROCEDURE — 1159F MED LIST DOCD IN RCRD: CPT | Mod: CPTII,S$GLB,, | Performed by: OBSTETRICS & GYNECOLOGY

## 2023-09-13 PROCEDURE — 99999 PR PBB SHADOW E&M-EST. PATIENT-LVL III: CPT | Mod: PBBFAC,,, | Performed by: OBSTETRICS & GYNECOLOGY

## 2023-09-13 PROCEDURE — 3077F SYST BP >= 140 MM HG: CPT | Mod: CPTII,S$GLB,, | Performed by: OBSTETRICS & GYNECOLOGY

## 2023-09-13 PROCEDURE — 3078F DIAST BP <80 MM HG: CPT | Mod: CPTII,S$GLB,, | Performed by: OBSTETRICS & GYNECOLOGY

## 2023-09-13 PROCEDURE — 1160F RVW MEDS BY RX/DR IN RCRD: CPT | Mod: CPTII,S$GLB,, | Performed by: OBSTETRICS & GYNECOLOGY

## 2023-09-13 PROCEDURE — 3078F PR MOST RECENT DIASTOLIC BLOOD PRESSURE < 80 MM HG: ICD-10-PCS | Mod: CPTII,S$GLB,, | Performed by: OBSTETRICS & GYNECOLOGY

## 2023-09-13 RX ORDER — PANTOPRAZOLE SODIUM 40 MG/1
40 TABLET, DELAYED RELEASE ORAL EVERY MORNING
COMMUNITY
Start: 2023-07-19

## 2023-09-13 RX ORDER — SPIRONOLACTONE 25 MG/1
25 TABLET ORAL DAILY
COMMUNITY

## 2023-09-13 NOTE — PROGRESS NOTES
Chief Complaint   Patient presents with    Follow-up        HPI: Patient is a 63 y.o. female  with stage 2 POP presents today for a follow up visit. She did 3 visits of pelvic floor PT. States that she did not feel that it helped correct the vaginal bulge that is present. She reports being more aware of it since her last visit. She states that she has had a little urinary urgency and a couple of episodes of urgency urinary incontinence. She has had a sling previously for stress urinary incontinence symptoms associated with exercise.       REVIEW OF SYSTEMS:  A full 14-point ROS was performed and was significant for those  mentioned in the HPI.     The following portions of the patient's history were reviewed and updated as appropriate: allergies, current medications, past medical history, past surgical history and problem list.    PHYSICAL EXAMINATION    Vitals:    23 1040   BP: (!) 170/76   Pulse: 62        General: Healthy in appearance, Well nourished, Affect Normal, NAD.  Skin: Normal temperature, No atypical lesions or rash.  Ext: No clubbing, cyanosis, edema or varicosities.    Genitourinary-  Vulva: normal without lesions, masses, atrophy or pain  Urethra: meatus central and normal in appearance, no prolapse/caruncle, no masses or discharge. Empty supine stress test was negative.  Bladder: non-tender, no masses  Vagina: No discharge or lesions, moderate atrophy, no masses appreciated.  [See POP-Q]  Cervix: absent  Uterus:  absent  Adnexa: absent    Levator strength: 4/5  Levator tenderness: left 0/10 and right 0/10    POP-Q Exam- pelvic organ prolapse quantitative    Aa 0  Anterior Wall Ba 0  Anterior wall C -3.5  Cervix or cuff   Gh 6    Genital hiatus pb 3    perineal body tvl 10    Total vaginal length   Ap -1.5  Posterior wall Bp -1.5  Posterior wall D n/a  Posterior formix     Without Uterus       No visits with results within 1 Day(s) from this visit.   Latest known visit with results is:    Office Visit on 05/16/2023   Component Date Value Ref Range Status    POC Residual Urine Volume 05/16/2023 91  0 - 100 mL Final        ASSESSMENT & PLAN:    Vaginal vault prolapse after hysterectomy    Cystocele, midline    Rectocele    Urge incontinence  -     Simple Urodynamics w/ Cysto; Future       Exam findings and treatment options were discussed in detail with the patient. Her symptoms and exam findings are consistent with urge urinary incontinence and pelvic organ prolapse. Patient feels that her condition is severe enough to consider surgical correction. In terms of surgery, vaginal, abdominal and laparoscopic approaches were considered. The planned surgical procedure includes L/S sacrocolpopexy and perineorrhaphy with possible anterior/posterior repair and cystoscopy. Risks and benefits of the surgery were explained in detail and further information was provided. Patient was provided the opportunity to ask questions and further discussion occurred as necessary. She was instructed to make a follow-up appointment for further counseling and urodynamic testing and cystoscopy as indicated.    All questions were answered today. The patient was encouraged to contact the office as needed with any additional questions or concerns.     Total time spent on visit was 30 minutes.  This includes face to face time and non-face to face time preparing to see the patient (eg, review of tests), Obtaining and/or reviewing separately obtained history, Documenting clinical information in the electronic or other health record, Independently interpreting resultsand communicating results to the patient/family/caregiver, or Care coordination.    Glenna Miguel MD

## 2023-09-18 ENCOUNTER — PATIENT MESSAGE (OUTPATIENT)
Dept: UROGYNECOLOGY | Facility: CLINIC | Age: 63
End: 2023-09-18
Payer: COMMERCIAL

## 2023-10-26 ENCOUNTER — PROCEDURE VISIT (OUTPATIENT)
Dept: UROGYNECOLOGY | Facility: CLINIC | Age: 63
End: 2023-10-26
Payer: COMMERCIAL

## 2023-10-26 VITALS
SYSTOLIC BLOOD PRESSURE: 148 MMHG | BODY MASS INDEX: 26.69 KG/M2 | WEIGHT: 145.06 LBS | DIASTOLIC BLOOD PRESSURE: 90 MMHG | HEIGHT: 62 IN

## 2023-10-26 DIAGNOSIS — N81.11 CYSTOCELE, MIDLINE: ICD-10-CM

## 2023-10-26 DIAGNOSIS — N99.3 VAGINAL VAULT PROLAPSE AFTER HYSTERECTOMY: ICD-10-CM

## 2023-10-26 DIAGNOSIS — N39.41 URGE INCONTINENCE: Primary | ICD-10-CM

## 2023-10-26 DIAGNOSIS — N81.6 RECTOCELE: ICD-10-CM

## 2023-10-26 LAB
BILIRUB SERPL-MCNC: NORMAL MG/DL
BLOOD URINE, POC: NORMAL
CLARITY, POC UA: CLEAR
COLOR, POC UA: NORMAL
GLUCOSE UR QL STRIP: NORMAL
KETONES UR QL STRIP: NORMAL
LEUKOCYTE ESTERASE URINE, POC: NORMAL
NITRITE, POC UA: NORMAL
PH, POC UA: 5
PROTEIN, POC: NORMAL
SPECIFIC GRAVITY, POC UA: 1
UROBILINOGEN, POC UA: NORMAL

## 2023-10-26 PROCEDURE — 51797 INTRAABDOMINAL PRESSURE TEST: CPT | Mod: S$GLB,,, | Performed by: OBSTETRICS & GYNECOLOGY

## 2023-10-26 PROCEDURE — 51728 CYSTOMETROGRAM W/VP: CPT | Mod: S$GLB,,, | Performed by: OBSTETRICS & GYNECOLOGY

## 2023-10-26 PROCEDURE — 81002 PR URINALYSIS NONAUTO W/O SCOPE: ICD-10-PCS | Mod: S$GLB,,, | Performed by: OBSTETRICS & GYNECOLOGY

## 2023-10-26 PROCEDURE — 51728 PR COMPLEX CYSTOMETROGRAM VOIDING PRESSURE STUDIES: ICD-10-PCS | Mod: S$GLB,,, | Performed by: OBSTETRICS & GYNECOLOGY

## 2023-10-26 PROCEDURE — 51797 PR VOIDING PRESS STUDY INTRA-ABDOMINAL VOID: ICD-10-PCS | Mod: S$GLB,,, | Performed by: OBSTETRICS & GYNECOLOGY

## 2023-10-26 PROCEDURE — 51784 ANAL/URINARY MUSCLE STUDY: CPT | Mod: 51,S$GLB,, | Performed by: OBSTETRICS & GYNECOLOGY

## 2023-10-26 PROCEDURE — 51741 PR UROFLOWMETRY, COMPLEX: ICD-10-PCS | Mod: 51,S$GLB,, | Performed by: OBSTETRICS & GYNECOLOGY

## 2023-10-26 PROCEDURE — 51741 ELECTRO-UROFLOWMETRY FIRST: CPT | Mod: 51,S$GLB,, | Performed by: OBSTETRICS & GYNECOLOGY

## 2023-10-26 PROCEDURE — 51784 PR ANAL/URINARY MUSCLE STUDY: ICD-10-PCS | Mod: 51,S$GLB,, | Performed by: OBSTETRICS & GYNECOLOGY

## 2023-10-26 PROCEDURE — 81002 URINALYSIS NONAUTO W/O SCOPE: CPT | Mod: S$GLB,,, | Performed by: OBSTETRICS & GYNECOLOGY

## 2023-10-26 RX ORDER — CIPROFLOXACIN 500 MG/1
500 TABLET ORAL
Status: COMPLETED | OUTPATIENT
Start: 2023-10-26 | End: 2023-10-26

## 2023-10-26 RX ORDER — LIDOCAINE HYDROCHLORIDE 20 MG/ML
JELLY TOPICAL ONCE
Status: COMPLETED | OUTPATIENT
Start: 2023-10-26 | End: 2023-10-26

## 2023-10-26 RX ADMIN — LIDOCAINE HYDROCHLORIDE 5 ML: 20 JELLY TOPICAL at 02:10

## 2023-10-26 RX ADMIN — CIPROFLOXACIN 500 MG: 500 TABLET ORAL at 02:10

## 2023-10-26 NOTE — PROCEDURES
Complex Urodynamics w/ Cysto    Date/Time: 10/26/2023 1:00 PM    Performed by: Glenna Miguel MD  Authorized by: Gelnna Miguel MD  Preparation: Patient was prepped and draped in the usual sterile fashion.  Local anesthesia used: no    Anesthesia:  Local anesthesia used: no    Sedation:  Patient sedated: no    Patient tolerance: patient tolerated the procedure well with no immediate complications    TITLE OF OPERATION:  Complex cystometry.  Complex uroflowmetry.  Electromyography with surface electrodes.  Pressure voiding flow study.  Abdominal pressure measurement.  Leak point pressure measurement.    INDICATIONS:  Urge incontinence  Vaginal vault prolapse after hysterectomy  Cystocele  Rectocele    SURGEONS NARRATIVE:  A time out was performed in which the patient identity and procedure were confirmed.  Urodynamic evaluation was performed using a computerized system ([employer], [employer]  Uroflowmetry was performed on the patient in the sitting position without catheters in place.  Subsequent urodynamic testing was performed with the patient in the lithotomy position at 45 degrees. Water charged catheters were used with sterile water as the infusion medium. Vesical and abdominal (rectal) pressures were measured, and detrusor pressure was calculated. EMG activity was recorded with surface electrodes. During filling, room temperature sterile water was infused at a rate of 30 cubic centimeters per minute. The patient was asked cough after instillation of each 100cc volume. Two Valsalva leak point pressures and two cough leak point pressures were performed with the catheters in place at 300 cubic centimeters and again at maximum capacity. Valsalva leak point pressure was defined as the difference between vesical pressure at which leakage was noted (visualized at the external urethral meatus) and the baseline vesical pressure. Following urodynamic testing, a pressure flow study was performed with the patient  in the sitting position. Vesical and abdominal pressures were monitored and detrusor pressures were calculated. After the pressure flow study, the catheters were then removed. The patient tolerated the procedure well.       1.  VOIDING PHASE:      a.  Uroflowmetry:  Prolapse reduction: No  Voided volume:  105 mL   Voiding time:   35 seconds  Max flow:  7 mL/s  Avg flow:   3 mL/s   PVR:   5 mL    The overall configuration of this uroflow study was  interrupted.      b.  Pressure flow:  Prolapse reduction: No  Voided volume:   273 mL Remainder voided in a hat on the toilet (225 mL)  Voiding time:   1:30 min:seconds  Peak flow:  14 mL/s   Avg flow:  3.5 mL/s  Max det pressure:  56  cm H20  Det pressure at max flow: 29 cm H20  Void initiated by  detrusor contraction.    Urethral relaxation (EMG):  no.      The overall configuration of this pressure flow study was interrupted.      2.  FILLING PHASE:  1st sensation: 86 mL  1st desire:  261 mL  Strong desire:  383 mL  Urgency/ Capacity:  472 mL  EMG activity during filling:   quiet  Detrusor contractions observed: No.      3.  URETHRAL FUNCTION/STORAGE PHASE:    a.  WITH prolapse reduction:  CLPP (300 mL): Negative  at  148 cm H20  VLPP (300 mL): Negative  at  100 cm H20   CLPP (MAX ):    Negative  at  144 cm H20  VLPP (MAX):     Positive  at  84 cm H20    These findings are consistent with Positive urodynamic stress incontinence.    Assessment:  UF  is interrupted.  PF  is interrupted. Max capacity is normal at 472mL.  DO (--).  ZA (+).        CYSTOSCOPY  Patient taken to procedure room and placed in dorsal lithotomy position. Perineum was prepped and draped using sterile technique.The urethra was prepped with betadine, and 10 mL of 2% lidocaine gel were instilled into the bladder through the urethra.   Next, cystourethroscopy was performed utilizing a flexible scope. The flexible cystourethroscope was introduced into the bladder under direct visualization. The bladder  was distended with approximately 300 cubic centimeters of sterile water. A systematic survey was performed in which the bladder was surveyed using multiple sequential passes in a clockwise fashion from the bladder dome to the bladder base to the urethrovesical junction. The trigone and ureteral orifices were observed. The scope was then flipped back on itself, and the urethrovesical junction was viewed. Upon completion of the bladder survey, the telescope was then completely withdrawn.    FINDINGS    Meatus: normal in appearance  Urethra: normal in appearance  Ureteral orifices: normal in appearance with clear efflux from the bilateral ureteral orifices  Bladder Diverticulum: none  Bladder Stone(s): none  Bladder Tumor: none   Bladder Mucosa: normal in appearance  Bladder Wall: normal in appearance    Patient tolerated procedure well without complications. Antibiotic prophylaxis was given.      ASSESSMENT/PLAN  Urge incontinence  -     POCT URINE DIPSTICK WITHOUT MICROSCOPE  -     Complex Urodynamics w/ Cysto  -     LIDOcaine HCl 2% urojet  -     ciprofloxacin HCl tablet 500 mg    Vaginal vault prolapse after hysterectomy    Cystocele, midline    Rectocele      62 yo with stage 2 POP and urge incontinence presented today for urodynamic testing and cystoscopy due to h/o prior sling. Her cystoscopy was normal and there was no evidence of a mesh erosion. Patients urodynamic testing shows that she voids in an interrupted fashion. She has stress urinary incontinence at maximum capacity with valsalva. Will review options of repeating a sling versus Bulkamid.     Glenna Miguel MD, MPH  Division of Urogynecology  [address] [place] [address], [address]  [place], [place] [id number] [phone]

## 2023-11-03 ENCOUNTER — OFFICE VISIT (OUTPATIENT)
Dept: UROGYNECOLOGY | Facility: CLINIC | Age: 63
End: 2023-11-03
Payer: COMMERCIAL

## 2023-11-03 VITALS
WEIGHT: 145 LBS | SYSTOLIC BLOOD PRESSURE: 167 MMHG | DIASTOLIC BLOOD PRESSURE: 81 MMHG | HEART RATE: 63 BPM | BODY MASS INDEX: 26.52 KG/M2

## 2023-11-03 DIAGNOSIS — N39.41 URGE INCONTINENCE: ICD-10-CM

## 2023-11-03 DIAGNOSIS — N81.11 CYSTOCELE, MIDLINE: ICD-10-CM

## 2023-11-03 DIAGNOSIS — N99.3 VAGINAL VAULT PROLAPSE AFTER HYSTERECTOMY: Primary | ICD-10-CM

## 2023-11-03 DIAGNOSIS — K59.00 CONSTIPATION, UNSPECIFIED CONSTIPATION TYPE: ICD-10-CM

## 2023-11-03 DIAGNOSIS — N81.6 RECTOCELE: ICD-10-CM

## 2023-11-03 PROCEDURE — 1159F MED LIST DOCD IN RCRD: CPT | Mod: CPTII,S$GLB,, | Performed by: OBSTETRICS & GYNECOLOGY

## 2023-11-03 PROCEDURE — 99214 PR OFFICE/OUTPT VISIT, EST, LEVL IV, 30-39 MIN: ICD-10-PCS | Mod: S$GLB,,, | Performed by: OBSTETRICS & GYNECOLOGY

## 2023-11-03 PROCEDURE — 3079F PR MOST RECENT DIASTOLIC BLOOD PRESSURE 80-89 MM HG: ICD-10-PCS | Mod: CPTII,S$GLB,, | Performed by: OBSTETRICS & GYNECOLOGY

## 2023-11-03 PROCEDURE — 99999 PR PBB SHADOW E&M-EST. PATIENT-LVL III: ICD-10-PCS | Mod: PBBFAC,,, | Performed by: OBSTETRICS & GYNECOLOGY

## 2023-11-03 PROCEDURE — 3008F BODY MASS INDEX DOCD: CPT | Mod: CPTII,S$GLB,, | Performed by: OBSTETRICS & GYNECOLOGY

## 2023-11-03 PROCEDURE — 99999 PR PBB SHADOW E&M-EST. PATIENT-LVL III: CPT | Mod: PBBFAC,,, | Performed by: OBSTETRICS & GYNECOLOGY

## 2023-11-03 PROCEDURE — 3079F DIAST BP 80-89 MM HG: CPT | Mod: CPTII,S$GLB,, | Performed by: OBSTETRICS & GYNECOLOGY

## 2023-11-03 PROCEDURE — 3008F PR BODY MASS INDEX (BMI) DOCUMENTED: ICD-10-PCS | Mod: CPTII,S$GLB,, | Performed by: OBSTETRICS & GYNECOLOGY

## 2023-11-03 PROCEDURE — 1160F RVW MEDS BY RX/DR IN RCRD: CPT | Mod: CPTII,S$GLB,, | Performed by: OBSTETRICS & GYNECOLOGY

## 2023-11-03 PROCEDURE — 99214 OFFICE O/P EST MOD 30 MIN: CPT | Mod: S$GLB,,, | Performed by: OBSTETRICS & GYNECOLOGY

## 2023-11-03 PROCEDURE — 3077F PR MOST RECENT SYSTOLIC BLOOD PRESSURE >= 140 MM HG: ICD-10-PCS | Mod: CPTII,S$GLB,, | Performed by: OBSTETRICS & GYNECOLOGY

## 2023-11-03 PROCEDURE — 1159F PR MEDICATION LIST DOCUMENTED IN MEDICAL RECORD: ICD-10-PCS | Mod: CPTII,S$GLB,, | Performed by: OBSTETRICS & GYNECOLOGY

## 2023-11-03 PROCEDURE — 1160F PR REVIEW ALL MEDS BY PRESCRIBER/CLIN PHARMACIST DOCUMENTED: ICD-10-PCS | Mod: CPTII,S$GLB,, | Performed by: OBSTETRICS & GYNECOLOGY

## 2023-11-03 PROCEDURE — 3077F SYST BP >= 140 MM HG: CPT | Mod: CPTII,S$GLB,, | Performed by: OBSTETRICS & GYNECOLOGY

## 2023-11-03 NOTE — PROGRESS NOTES
Chief Complaint   Patient presents with    Follow-up        HPI: Patient is a 63 y.o. female  with stage 2 POP presents today for a follow up visit to review her urodynamic testing results. Patient has a h/o a hysterectomy and sling for stress urinary incontinence. Patient desires definitive intervention for her prolapse symptoms.     REVIEW OF SYSTEMS:  A full 14-point ROS was performed and was significant for those  mentioned in the HPI.    The following portions of the patient's history were reviewed and updated as appropriate: allergies, current medications, past medical history, past surgical history and problem list.    PHYSICAL EXAMINATION    Vitals:    23 0805   BP: (!) 167/81   Pulse: 63        General: Healthy in appearance, Well nourished, Affect Normal, NAD.    No visits with results within 1 Day(s) from this visit.   Latest known visit with results is:   Procedure visit on 10/26/2023   Component Date Value Ref Range Status    Color, UA 10/26/2023 Yuli   Final    pH, UA 10/26/2023 5   Final    WBC, UA 10/26/2023 neg   Final    Nitrite, UA 10/26/2023 neg   Final    Protein, POC 10/26/2023 neg   Final    Glucose, UA 10/26/2023 normal   Final    Ketones, UA 10/26/2023 neg   Final    Urobilinogen, UA 10/26/2023 normal   Final    Bilirubin, POC 10/26/2023 neg   Final    Blood, UA 10/26/2023 neg   Final    Clarity, UA 10/26/2023 Clear   Final    Spec Grav UA 10/26/2023 1.005   Final        ASSESSMENT & PLAN:  Vaginal vault prolapse after hysterectomy    Cystocele, midline    Rectocele    Urge incontinence    Constipation, unspecified constipation type       64 yo with stage 2 POP and urge incontinence presented today for urodynamic testing and cystoscopy due to h/o prior sling. Patient has a h/o diverticulitis and chronic constipation. She previously under went a cystoscopy on 10/26/23 which showed no mesh erosion. She also under went urodynamic testing.     Today, we reviewed her urodynamic  "testing which showed that she has normal sensations and a normal bladder capacity of 472 mL. She was noted to have stress urinary incontinence at 450 mL with valsalva.     Reviewed options and the pros and cons of sling versus Bulkamid. Patient opts to proceed with sling.     Discussed correction for her prolapse and will plan for a L/S sacrocolpopexy, possible anterior/posterior repair, perineorrhaphy, TVT-O, and cystoscopy. She reports having her tubes and ovaries taken out at the time of her hysterectomy. Aware that due to past surgery and scar tissue may need to convert to a vaginal procedure and we may need to perform a sacrospinous ligament fixation.     We reviewed the risks and benefits of the planned surgical procedure which included but were not limited to the potential conversion to an open or vaginal procedure, pelvic pain, pain with intercourse, infection, bleeding, need for transfusion, risks of transfusion, injury to bowel, urinary tract, blood vessels or nerves, urinary retention, prolonged catheterization, mesh erosion or infection, no change in symptoms, new onset ZA or UUI, recurrence of prolapse, change in urine stream, recurrent UTI's, fistula formation, need for ostomy (colostomy/ileostomy/ nephrostomy/ ileostomy) and need for further surgery. Patient was provided the opportunity to ask questions, All questions were answered. The informed consent was signed and a copy was provided to the patient.     FDA safety advisory and mesh risks specific to the patient's procedure were reviewed.     Discussed management of her constipation. Although she takes Linzess the patient's BM"s remain irregular and intermittently constipation. Discussed adding daily a stool softener or Miralax. Suggested that if she takes Miralax to take it daily as it works best in this manner as opposed to as needed.     All questions were answered today. The patient was encouraged to contact the office as needed with any " additional questions or concerns.     Total time spent on visit was 30 minutes.  This includes face to face time and non-face to face time preparing to see the patient (eg, review of tests), Obtaining and/or reviewing separately obtained history, Documenting clinical information in the electronic or other health record, Independently interpreting resultsand communicating results to the patient/family/caregiver, or Care coordination.    Glenna Miguel MD

## 2023-11-10 ENCOUNTER — TELEPHONE (OUTPATIENT)
Dept: UROGYNECOLOGY | Facility: CLINIC | Age: 63
End: 2023-11-10
Payer: COMMERCIAL

## 2023-11-10 NOTE — TELEPHONE ENCOUNTER
----- Message from Kenroy Nazario sent at 11/10/2023  3:15 PM CST -----      Name of Who is Calling: MARIAELENA MARS [[id number]]      What is the request in detail: Pt called to get scheduled for a procedure.Please contact to further discuss and advise.          Can the clinic reply by [facility]: Y      What Number to [facility] Back [facility] not [facility]: [phone]

## 2023-11-13 ENCOUNTER — PATIENT MESSAGE (OUTPATIENT)
Dept: UROGYNECOLOGY | Facility: CLINIC | Age: 63
End: 2023-11-13
Payer: COMMERCIAL

## 2023-11-14 ENCOUNTER — TELEPHONE (OUTPATIENT)
Dept: UROGYNECOLOGY | Facility: CLINIC | Age: 63
End: 2023-11-14
Payer: COMMERCIAL

## 2023-11-14 NOTE — TELEPHONE ENCOUNTER
Patient agrees to 03/07/2023 surgical date. Will keep on list for earlier cancellation.  MACARENA HoltP-BC

## 2023-11-30 ENCOUNTER — PATIENT MESSAGE (OUTPATIENT)
Dept: UROGYNECOLOGY | Facility: CLINIC | Age: 63
End: 2023-11-30
Payer: COMMERCIAL

## 2023-12-01 ENCOUNTER — TELEPHONE (OUTPATIENT)
Dept: UROLOGY | Facility: CLINIC | Age: 63
End: 2023-12-01
Payer: COMMERCIAL

## 2023-12-04 DIAGNOSIS — N99.3 VAGINAL VAULT PROLAPSE AFTER HYSTERECTOMY: Primary | ICD-10-CM

## 2023-12-04 DIAGNOSIS — N39.3 SUI (STRESS URINARY INCONTINENCE, FEMALE): ICD-10-CM

## 2023-12-04 DIAGNOSIS — Z01.818 PREOP TESTING: Primary | ICD-10-CM

## 2023-12-05 ENCOUNTER — PATIENT MESSAGE (OUTPATIENT)
Dept: UROLOGY | Facility: CLINIC | Age: 63
End: 2023-12-05
Payer: COMMERCIAL

## 2023-12-05 ENCOUNTER — TELEPHONE (OUTPATIENT)
Dept: UROLOGY | Facility: CLINIC | Age: 63
End: 2023-12-05
Payer: COMMERCIAL

## 2023-12-05 NOTE — TELEPHONE ENCOUNTER
DENISHAM to offer 1/4 surgery date. Called Teodora, patient's daughter, for confirmation- patient's daughter agrees to 1/4 surgery date. Will r/s pre-op once [place] is [place] the country.

## 2023-12-07 ENCOUNTER — DOCUMENTATION ONLY (OUTPATIENT)
Dept: UROLOGY | Facility: CLINIC | Age: 63
End: 2023-12-07
Payer: COMMERCIAL

## 2023-12-18 ENCOUNTER — TELEPHONE (OUTPATIENT)
Dept: UROLOGY | Facility: CLINIC | Age: 63
End: 2023-12-18
Payer: COMMERCIAL

## 2023-12-18 NOTE — TELEPHONE ENCOUNTER
----- Message from Kenroy Nazario sent at 12/18/2023  8:46 AM CST -----      Name of Who is Calling: MARIAELENA MARS [[id number]]      What is the request in detail: Hoang Bailon called regarding a clearance letter for pt and would like it faxed to the office.Please contact to further discuss and advise.          Can the clinic reply by [facility]: N      What Number to Call Back if not in [facility]: Hoang Bailon fax [phone]

## 2023-12-22 ENCOUNTER — HOSPITAL ENCOUNTER (OUTPATIENT)
Dept: PREADMISSION TESTING | Facility: OTHER | Age: 63
Discharge: HOME | End: 2023-12-22
Attending: OBSTETRICS & GYNECOLOGY
Payer: COMMERCIAL

## 2023-12-22 ENCOUNTER — ANESTHESIA EVENT (OUTPATIENT)
Dept: SURGERY | Facility: OTHER | Age: 63
End: 2023-12-22
Payer: COMMERCIAL

## 2023-12-22 ENCOUNTER — OFFICE VISIT (OUTPATIENT)
Dept: UROGYNECOLOGY | Facility: CLINIC | Age: 63
End: 2023-12-22
Payer: COMMERCIAL

## 2023-12-22 VITALS
DIASTOLIC BLOOD PRESSURE: 78 MMHG | HEIGHT: 62 IN | BODY MASS INDEX: 27.14 KG/M2 | WEIGHT: 147.5 LBS | HEART RATE: 70 BPM | SYSTOLIC BLOOD PRESSURE: 130 MMHG

## 2023-12-22 VITALS
DIASTOLIC BLOOD PRESSURE: 84 MMHG | WEIGHT: 145 LBS | TEMPERATURE: 98 F | OXYGEN SATURATION: 97 % | HEART RATE: 73 BPM | SYSTOLIC BLOOD PRESSURE: 165 MMHG | BODY MASS INDEX: 26.68 KG/M2 | HEIGHT: 62 IN

## 2023-12-22 DIAGNOSIS — N99.3 VAGINAL VAULT PROLAPSE AFTER HYSTERECTOMY: ICD-10-CM

## 2023-12-22 DIAGNOSIS — N39.46 MIXED STRESS AND URGE URINARY INCONTINENCE: ICD-10-CM

## 2023-12-22 DIAGNOSIS — N81.6 RECTOCELE: ICD-10-CM

## 2023-12-22 DIAGNOSIS — Z01.818 PREOPERATIVE EXAM FOR GYNECOLOGIC SURGERY: Primary | ICD-10-CM

## 2023-12-22 DIAGNOSIS — Z01.818 PREOP TESTING: ICD-10-CM

## 2023-12-22 DIAGNOSIS — N81.11 CYSTOCELE, MIDLINE: ICD-10-CM

## 2023-12-22 LAB
ABO + RH BLD: NORMAL
BLD GP AB SCN CELLS X3 SERPL QL: NORMAL
RH BLD: NORMAL
SPECIMEN OUTDATE: NORMAL

## 2023-12-22 PROCEDURE — 86901 BLOOD TYPING SEROLOGIC RH(D): CPT | Mod: 91 | Performed by: NURSE PRACTITIONER

## 2023-12-22 PROCEDURE — 99499 UNLISTED E&M SERVICE: CPT | Mod: S$GLB,,, | Performed by: NURSE PRACTITIONER

## 2023-12-22 PROCEDURE — 99499 NO LOS: ICD-10-PCS | Mod: S$GLB,,, | Performed by: NURSE PRACTITIONER

## 2023-12-22 PROCEDURE — 99999 PR PBB SHADOW E&M-EST. PATIENT-LVL III: CPT | Mod: PBBFAC,,, | Performed by: NURSE PRACTITIONER

## 2023-12-22 PROCEDURE — 36415 COLL VENOUS BLD VENIPUNCTURE: CPT | Performed by: NURSE PRACTITIONER

## 2023-12-22 PROCEDURE — 86901 BLOOD TYPING SEROLOGIC RH(D): CPT | Performed by: NURSE PRACTITIONER

## 2023-12-22 PROCEDURE — 99999 PR PBB SHADOW E&M-EST. PATIENT-LVL III: ICD-10-PCS | Mod: PBBFAC,,, | Performed by: NURSE PRACTITIONER

## 2023-12-22 RX ORDER — GABAPENTIN 300 MG/1
300 CAPSULE ORAL NIGHTLY
COMMUNITY
Start: 2023-10-31 | End: 2023-12-22

## 2023-12-22 RX ORDER — SODIUM CHLORIDE, SODIUM LACTATE, POTASSIUM CHLORIDE, CALCIUM CHLORIDE 600; 310; 30; 20 MG/100ML; MG/100ML; MG/100ML; MG/100ML
INJECTION, SOLUTION INTRAVENOUS CONTINUOUS
Status: CANCELLED | OUTPATIENT
Start: 2023-12-22

## 2023-12-22 RX ORDER — DOXYCYCLINE 100 MG/1
100 CAPSULE ORAL
COMMUNITY
Start: 2023-12-18 | End: 2023-12-28

## 2023-12-22 RX ORDER — IPRATROPIUM BROMIDE 42 UG/1
2 SPRAY, METERED NASAL 3 TIMES DAILY PRN
COMMUNITY
Start: 2023-11-08

## 2023-12-22 RX ORDER — CETIRIZINE HYDROCHLORIDE 10 MG/1
1 TABLET ORAL DAILY
COMMUNITY

## 2023-12-22 RX ORDER — LIDOCAINE HYDROCHLORIDE 10 MG/ML
0.5 INJECTION, SOLUTION EPIDURAL; INFILTRATION; INTRACAUDAL; PERINEURAL ONCE
Status: CANCELLED | OUTPATIENT
Start: 2023-12-22 | End: 2023-12-22

## 2023-12-22 RX ORDER — ACETAMINOPHEN 500 MG
1000 TABLET ORAL
Status: CANCELLED | OUTPATIENT
Start: 2023-12-22 | End: 2023-12-22

## 2023-12-22 RX ORDER — MUPIROCIN 20 MG/G
OINTMENT TOPICAL 2 TIMES DAILY
COMMUNITY
Start: 2023-11-28 | End: 2024-02-20

## 2023-12-22 NOTE — H&P (VIEW-ONLY)
Urogyn follow up  [unfilled]  .  [facility] - UROGYNECOLOGY  [address] [address]    Nicolasa Bean  [id number]  1960      Nicolasa Bean is a 63 y.o.here for a urogyn preop visit    Last HPI from 2023  HPI: Patient is a 63 y.o. female  who presents today stating that she has a transvaginal sling with mesh to prevent leakage of urine in  performed in [facility], [place], [place] Urologist. States that this last Saturday she strained a little and when she wiped she noticed a bulge. She reports that she mainly notices it when she is sitting on the toilet. It protrudes past the vaginal opening and is about a golf ball in size. She denies needing to splint with urination and BM's.      She reports that she as decreased sensation since her sling was placed. She states that she only has urinary urgency when she sits for a long period of time. She denies urinary frequency. She denies stress urinary incontinence with coughing, sneezing and laughing. May occasionally have urge incontinence. She denies nocturia and enuresis. She drinks water 70-80 ounces and one large cup of coffee (12 ounces).      She reports a h/o constipation. Takes Linzess and has been on the medication since about . BM's are regular as long as she takes the Linzess. She denies accidental bowel leakage.       POP-Q Exam- pelvic organ prolapse quantitative     Aa +1  Anterior Wall Ba +1  Anterior wall C -5  Cervix or cuff   Gh 5.5  Genital hiatus pb 3  perineal body tvl 10  Total vaginal length   Ap -2  Posterior wall Bp -2  Posterior wall D n/a  Posterior fornix      without Uterus     POP-Q Stage: 2    2023  HPI: Patient is a 63 y.o. female  with stage 2 POP presents today for a follow up visit. She did 3 visits of pelvic floor PT. States that she did not feel that it helped correct the vaginal bulge that is present. She reports being more aware of it since her last  visit. She states that she has had a little urinary urgency and a couple of episodes of urgency urinary incontinence. She has had a sling previously for stress urinary incontinence symptoms associated with exercise.       Suds  10/26/2023  1.  VOIDING PHASE:       a.  Uroflowmetry:  Prolapse reduction: No  Voided volume:  105 mL   Voiding time:   35 seconds  Max flow:  7 mL/s  Avg flow:   3 mL/s   PVR:   5 mL     The overall configuration of this uroflow study was  interrupted.       b.  Pressure flow:  Prolapse reduction: No  Voided volume:   273 mL Remainder voided in a hat on the toilet (225 mL)  Voiding time:   1:30 min:seconds  Peak flow:  14 mL/s   Avg flow:  3.5 mL/s  Max det pressure:  56  cm H20  Det pressure at max flow: 29 cm H20  Void initiated by  detrusor contraction.    Urethral relaxation (EMG):  no.       The overall configuration of this pressure flow study was interrupted.       2.  FILLING PHASE:  1st sensation: 86 mL  1st desire:  261 mL  Strong desire:  383 mL  Urgency/ Capacity:  472 mL  EMG activity during filling:   quiet  Detrusor contractions observed: No.       3.  URETHRAL FUNCTION/STORAGE PHASE:     a.  WITH prolapse reduction:  CLPP (300 mL): Negative  at  148 cm H20  VLPP (300 mL): Negative  at  100 cm H20   CLPP (MAX ):    Negative  at  144 cm H20  VLPP (MAX):     Positive  at  84 cm H20     These findings are consistent with Positive urodynamic stress incontinence.     Assessment:  UF  is interrupted.  PF  is interrupted. Max capacity is normal at 472mL.  DO (--).  ZA (+).       Cysto  Meatus: normal in appearance  Urethra: normal in appearance  Ureteral orifices: normal in appearance with clear efflux from the bilateral ureteral orifices  Bladder Diverticulum: none  Bladder Stone(s): none  Bladder Tumor: none   Bladder Mucosa: normal in appearance  Bladder Wall: normal in appearance    2023  HPI: Patient is a 63 y.o. female  with stage 2 POP presents today for a  follow up visit to review her urodynamic testing results. Patient has a h/o a hysterectomy and sling for stress urinary incontinence. Patient desires definitive intervention for her prolapse symptoms.    Reviewed options and the pros and cons of sling versus Bulkamid. Patient opts to proceed with sling.      Discussed correction for her prolapse and will plan for a L/S sacrocolpopexy, possible anterior/posterior repair, perineorrhaphy, TVT-O, and cystoscopy. She reports having her tubes and ovaries taken out at the time of her hysterectomy. Aware that due to past surgery and scar tissue may need to convert to a vaginal procedure and we may need to perform a sacrospinous ligament fixation.     Changes from last visit:  Ready to proceed with surgery  Doing well with linzess    Past Medical History:   Diagnosis Date    Diverticulitis     GERD (gastroesophageal reflux disease)     IBS (irritable bowel syndrome)        Past Surgical History:   Procedure Laterality Date    HYSTERECTOMY  2004    with BSO    TUBAL LIGATION      vaginal sling      for ZA       Family History   Problem Relation Age of Onset    Diabetes Mother     Hypertension Mother     Heart failure Father     Hypertension Sister     Hypertension Sister        Social History     Socioeconomic History    Marital status: Single   Occupational History    Occupation:    Tobacco Use    Smoking status: Never    Smokeless tobacco: Never   Substance and Sexual Activity    Alcohol use: Yes     Comment: occ    Drug use: Never    Sexual activity: Yes     Partners: Male   Social History Narrative    Lives with significant other, Carlton. Together since 2009. Feels safe in her home and relationship.        Current Outpatient Medications   Medication Sig Dispense Refill    cetirizine (ZYRTEC) 10 MG tablet Take 1 tablet by mouth once daily.      doxycycline (VIBRAMYCIN) 100 MG Cap Take 100 mg by mouth.      gabapentin (NEURONTIN) 300 MG capsule Take 300 mg by  "mouth every evening.      ipratropium (ATROVENT) 42 mcg (0.06 %) nasal spray 2 sprays by Nasal route 3 (three) times daily as needed.      LINZESS 145 mcg Cap capsule Take 145 mcg by mouth every morning.      pantoprazole (PROTONIX) 40 MG tablet Take 40 mg by mouth every morning.      spironolactone (ALDACTONE) 25 MG tablet Take 25 mg by mouth once daily.      UNABLE TO FIND every 3 (three) months. Hormone pellets      BIOTIN ORAL Take 1 tablet by mouth.      estrogens, conjugated (PREMARIN VAGL) Premarin      mupirocin (BACTROBAN) 2 % ointment Apply topically 2 (two) times daily.      phentermine HCl (ADIPEX-P ORAL) Adipex       No current facility-administered medications for this visit.       Review of patient's allergies indicates:  No Known Allergies      ROS:  As per HPI.      Exam  /78 (BP Location: Right arm, Patient Position: Sitting, BP Method: Medium (Automatic))   Pulse 70   Ht 5' 2" (1.575 m)   Wt 66.9 kg (147 lb 7.8 oz)   BMI 26.98 kg/m²   General: alert and oriented, no acute distress  Respiratory: normal respiratory effort  Abd: soft, non-tender, non-distended    Pelvic--deferred    Impression  1. Preoperative exam for gynecologic surgery        2. Vaginal vault prolapse after hysterectomy        3. Cystocele, midline        4. Rectocele        5. Mixed stress and urge urinary incontinence          We reviewed the above issues and discussed options for short-term versus long-term management of her problems.   Plan:   Reviewed preop/ postop instructions for laparoscopic sacrocolpopexy, possible uterosacral suspension,  possible anterior/posterior repair with perineorrhaphy, possible laparotomy, placement of TVT-O, and cystourethroscopy.   T&S  Preoperative appointment with PCP or cardiology: Yes - pending  VTE Prophylaxis:  lovenox 40 mg sq daily + SCDs  Patient instructed on bowel prep and chlorahexadine/dial soap prep to perform day before & AM of surgery.   Proceed to OR for " above-mentioned procedure.      20 minutes were spent in face to face time with this patient  90 % of this time was spent in counseling and/or coordination of care    MARCO Holt-BC [facility]  Division of Female Pelvic Medicine and Reconstructive Surgery  Department of Obstetrics & Gynecology

## 2023-12-22 NOTE — DISCHARGE INSTRUCTIONS
Information to Prepare you for your Surgery    PRE-ADMIT TESTING -  [phone]    [address] [facility]          Your surgery has been scheduled at [facility]. We are pleased to have the opportunity to serve you. For Further Information please call [phone].    On the day of surgery please report to the Information Desk on the 1st floor.    CONTACT YOUR PHYSICIAN'S OFFICE THE DAY PRIOR TO YOUR SURGERY TO OBTAIN YOUR ARRIVAL TIME.     The evening before surgery do not eat anything after 9 p.m. ( this includes hard candy, chewing gum and mints).  You may only have GATORADE, POWERADE AND WATER  from 9 p.m. until you leave your home.   DO NOT DRINK ANY LIQUIDS ON THE WAY TO THE HOSPITAL.      Why does your anesthesiologist allow you to drink Gatorade/Powerade before surgery?  Gatorade/Powerade helps to increase your comfort before surgery and to decrease your nausea after surgery. The carbohydrates in Gatorade/Powerade help reduce your body's stress response to surgery.  If you are a diabetic-drink only water prior to surgery.    Outpatient Surgery- May allow 2 adult (18 and older) Support Persons (1 being the designated ) for all surgical/procedural patients. A breastfeeding mother will be allowed her infant and 2 adult Support Persons. No one under the age of 18 will be allowed in the building.      SPECIAL MEDICATION INSTRUCTIONS: TAKE medications checked off by the Anesthesiologist on your Medication List.    Angiogram Patients: Take medications as instructed by your physician, including aspirin.     Surgery Patients:    If you take ASPIRIN - Your PHYSICIAN/SURGEON will need to inform you IF/OR when you need to stop taking aspirin prior to your surgery.     The week prior to surgery do not ot take any medications containing IBUPROFEN or NSAIDS ( Advil, Motrin, Goodys, BC, Aleve, Naproxen etc) If you are not sure if you should take a medicine  please call your surgeon's office.  Ok to take Tylenol    Do Not Wear any make-up (especially eye make-up) to surgery. Please remove any false eyelashes or eyelash extensions. If you arrive the day of surgery with makeup/eyelashes on you will be required to remove prior to surgery. (There is a risk of corneal abrasions if eye makeup/eyelash extensions are not removed)      Leave all valuables at home.   Do Not wear any jewelry or watches, including any metal in body piercings. Jewelry must be removed prior to coming to the hospital.  There is a possibility that rings that are unable to be removed may be cut off if they are on the surgical extremity.    Please remove all hair extensions, wigs, clips and any other metal accessories/ ornaments from your hair.  These items may pose a flammable/fire risk in Surgery and must be removed.    Do not shave your surgical area at least 5 days prior to your surgery. The surgical prep will be performed at the hospital according to Infection Control regulations.    Contact Lens must be removed before surgery. Either do not wear the contact lens or bring a case and solution for storage.  Please bring a container for eyeglasses or dentures as required.  Bring any paperwork your physician has provided, such as consent forms,  history and physicals, doctor's orders, etc.   Bring comfortable clothes that are loose fitting to wear upon discharge. Take into consideration the type of surgery being performed.  Maintain your diet as advised per your physician the day prior to surgery.      Adequate rest the night before surgery is advised.   Park in the Parking lot behind the hospital or in the [facility] across the street from the parking lot. Parking is complimentary.  If you will be discharged the same day as your procedure, please arrange for a responsible adult to drive you home or to accompany you if traveling by taxi.   YOU WILL NOT BE PERMITTED TO DRIVE OR TO LEAVE THE  HOSPITAL ALONE AFTER SURGERY.   If you are being discharged the same day, it is strongly recommended that you arrange for someone to remain with you for the first 24 hrs following your surgery.    The Surgeon will speak to your family/visitor after your surgery regarding the outcome of your surgery and post op care.  The Surgeon may speak to you after your surgery, but there is a possibility you may not remember the details.  Please check with your family members regarding the conversation with the Surgeon.    We strongly recommend whoever is bringing you home be present for discharge instructions.  This will ensure a thorough understanding for your post op home care.          Thank you for your cooperation.  The Staff of [facility].            Bathing Instructions with Hibiclens    Shower the evening before and morning of your procedure with Chlorhexidine (Hibiclens)  do not use Chlorhexidine on your face or genitals. Do not get in your eyes.  Wash your face with water and your regular face wash/soap  Use your regular shampoo  Apply Chlorhexidine (Hibiclens) directly on your skin or on a wet washcloth and wash gently. When showering: Move away from the shower stream when applying Chlorhexidine (Hibiclens) to avoid rinsing off too soon.  Rinse thoroughly with warm water  Do not dilute Chlorhexidine (Hibiclens)   Dry off as usual, do not use any deodorant, powder, body lotions, perfume, after shave or cologne.

## 2023-12-22 NOTE — PROGRESS NOTES
Urogyn follow up  [unfilled]  .  [facility] - UROGYNECOLOGY  [address] [address]    Nicolasa Bean  [id number]  1960      Nicolasa Bean is a 63 y.o.here for a urogyn preop visit    Last HPI from 2023  HPI: Patient is a 63 y.o. female  who presents today stating that she has a transvaginal sling with mesh to prevent leakage of urine in  performed in [facility], [place], [place] Urologist. States that this last Saturday she strained a little and when she wiped she noticed a bulge. She reports that she mainly notices it when she is sitting on the toilet. It protrudes past the vaginal opening and is about a golf ball in size. She denies needing to splint with urination and BM's.      She reports that she as decreased sensation since her sling was placed. She states that she only has urinary urgency when she sits for a long period of time. She denies urinary frequency. She denies stress urinary incontinence with coughing, sneezing and laughing. May occasionally have urge incontinence. She denies nocturia and enuresis. She drinks water 70-80 ounces and one large cup of coffee (12 ounces).      She reports a h/o constipation. Takes Linzess and has been on the medication since about . BM's are regular as long as she takes the Linzess. She denies accidental bowel leakage.       POP-Q Exam- pelvic organ prolapse quantitative     Aa +1  Anterior Wall Ba +1  Anterior wall C -5  Cervix or cuff   Gh 5.5  Genital hiatus pb 3  perineal body tvl 10  Total vaginal length   Ap -2  Posterior wall Bp -2  Posterior wall D n/a  Posterior fornix      without Uterus     POP-Q Stage: 2    2023  HPI: Patient is a 63 y.o. female  with stage 2 POP presents today for a follow up visit. She did 3 visits of pelvic floor PT. States that she did not feel that it helped correct the vaginal bulge that is present. She reports being more aware of it since her last  visit. She states that she has had a little urinary urgency and a couple of episodes of urgency urinary incontinence. She has had a sling previously for stress urinary incontinence symptoms associated with exercise.       [place]  10/26/2023  1.  VOIDING PHASE:       a.  Uroflowmetry:  Prolapse reduction: No  Voided volume:  105 mL   Voiding time:   35 seconds  Max flow:  7 mL/s  Avg flow:   3 mL/s   PVR:   5 mL     The overall configuration of this uroflow study was  interrupted.       b.  Pressure flow:  Prolapse reduction: No  Voided volume:   273 mL Remainder voided in a hat on the toilet (225 mL)  Voiding time:   1:30 min:seconds  Peak flow:  14 mL/s   Avg flow:  3.5 mL/s  Max det pressure:  56  cm H20  Det pressure at max flow: 29 cm H20  Void initiated by  detrusor contraction.    Urethral relaxation (EMG):  no.       The overall configuration of this pressure flow study was interrupted.       2.  FILLING PHASE:  1st sensation: 86 mL  1st desire:  261 mL  Strong desire:  383 mL  Urgency/ Capacity:  472 mL  EMG activity during filling:   quiet  Detrusor contractions observed: No.       3.  URETHRAL FUNCTION/STORAGE PHASE:     a.  WITH prolapse reduction:  CLPP (300 mL): Negative  at  148 cm H20  VLPP (300 mL): Negative  at  100 cm H20   CLPP (MAX ):    Negative  at  144 cm H20  VLPP (MAX):     Positive  at  84 cm H20     These findings are consistent with Positive urodynamic stress incontinence.     Assessment:  UF  is interrupted.  PF  is interrupted. Max capacity is normal at 472mL.  DO (--).  ZA (+).       Cysto  Meatus: normal in appearance  Urethra: normal in appearance  Ureteral orifices: normal in appearance with clear efflux from the bilateral ureteral orifices  Bladder Diverticulum: none  Bladder Stone(s): none  Bladder Tumor: none   Bladder Mucosa: normal in appearance  Bladder Wall: normal in appearance    2023  HPI: Patient is a 63 y.o. female  with stage 2 POP presents today for a  follow up visit to review her urodynamic testing results. Patient has a h/o a hysterectomy and sling for stress urinary incontinence. Patient desires definitive intervention for her prolapse symptoms.    Reviewed options and the pros and cons of sling versus Bulkamid. Patient opts to proceed with sling.      Discussed correction for her prolapse and will plan for a L/S sacrocolpopexy, possible anterior/posterior repair, perineorrhaphy, TVT-O, and cystoscopy. She reports having her tubes and ovaries taken out at the time of her hysterectomy. Aware that due to past surgery and scar tissue may need to convert to a vaginal procedure and we may need to perform a sacrospinous ligament fixation.     Changes from last visit:  Ready to proceed with surgery  Doing well with linzess    Past Medical History:   Diagnosis Date    Diverticulitis     GERD (gastroesophageal reflux disease)     IBS (irritable bowel syndrome)        Past Surgical History:   Procedure Laterality Date    HYSTERECTOMY  2004    with BSO    TUBAL LIGATION      vaginal sling      for ZA       Family History   Problem Relation Age of Onset    Diabetes Mother     Hypertension Mother     Heart failure Father     Hypertension Sister     Hypertension Sister        Social History     Socioeconomic History    Marital status: Single   Occupational History    Occupation:    Tobacco Use    Smoking status: Never    Smokeless tobacco: Never   Substance and Sexual Activity    Alcohol use: Yes     Comment: occ    Drug use: Never    Sexual activity: Yes     Partners: Male   Social History Narrative    Lives with significant other, Carlton. Together since 2009. Feels safe in her home and relationship.        Current Outpatient Medications   Medication Sig Dispense Refill    cetirizine (ZYRTEC) 10 MG tablet Take 1 tablet by mouth once daily.      doxycycline (VIBRAMYCIN) 100 MG Cap Take 100 mg by mouth.      gabapentin (NEURONTIN) 300 MG capsule Take 300 mg by  "mouth every evening.      ipratropium (ATROVENT) 42 mcg (0.06 %) nasal spray 2 sprays by Nasal route 3 (three) times daily as needed.      LINZESS 145 mcg Cap capsule Take 145 mcg by mouth every morning.      pantoprazole (PROTONIX) 40 MG tablet Take 40 mg by mouth every morning.      spironolactone (ALDACTONE) 25 MG tablet Take 25 mg by mouth once daily.      UNABLE TO FIND every 3 (three) months. Hormone pellets      BIOTIN ORAL Take 1 tablet by mouth.      estrogens, conjugated (PREMARIN VAGL) Premarin      mupirocin (BACTROBAN) 2 % ointment Apply topically 2 (two) times daily.      phentermine HCl (ADIPEX-P ORAL) Adipex       No current facility-administered medications for this visit.       Review of patient's allergies indicates:  No Known Allergies      ROS:  As per HPI.      Exam  /78 (BP Location: Right arm, Patient Position: Sitting, BP Method: Medium (Automatic))   Pulse 70   Ht 5' 2" (1.575 m)   Wt 66.9 kg (147 lb 7.8 oz)   BMI 26.98 kg/m²   General: alert and oriented, no acute distress  Respiratory: normal respiratory effort  Abd: soft, non-tender, non-distended    Pelvic--deferred    Impression  1. Preoperative exam for gynecologic surgery        2. Vaginal vault prolapse after hysterectomy        3. Cystocele, midline        4. Rectocele        5. Mixed stress and urge urinary incontinence          We reviewed the above issues and discussed options for short-term versus long-term management of her problems.   Plan:   Reviewed preop/ postop instructions for laparoscopic sacrocolpopexy, possible uterosacral suspension,  possible anterior/posterior repair with perineorrhaphy, possible laparotomy, placement of TVT-O, and cystourethroscopy.   T&S  Preoperative appointment with PCP or cardiology: Yes - pending  VTE Prophylaxis:  lovenox 40 mg sq daily + SCDs  Patient instructed on bowel prep and chlorahexadine/dial soap prep to perform day before & AM of surgery.   Proceed to OR for " above-mentioned procedure.      20 minutes were spent in face to face time with this patient  90 % of this time was spent in counseling and/or coordination of care    MARCO Holt-BC [facility]  Division of Female Pelvic Medicine and Reconstructive Surgery  Department of Obstetrics & Gynecology

## 2023-12-22 NOTE — ANESTHESIA PREPROCEDURE EVALUATION
12/22/2023  Nicolasa Bean is a 63 y.o., female.      Pre-op Assessment    I have reviewed the Patient Summary Reports.     I have reviewed the Nursing Notes. I have reviewed the NPO Status.   I have reviewed the Medications.     Review of Systems  Anesthesia Hx:             Denies Family Hx of Anesthesia complications.    Denies Personal Hx of Anesthesia complications.                    Social:  Non-Smoker       Hematology/Oncology:  Hematology Normal   Oncology Normal                                   EENT/Dental:  EENT/Dental Normal           Cardiovascular:  Cardiovascular Normal                                            Pulmonary:  Pulmonary Normal                       Renal/:  Renal/ Normal                 Hepatic/GI:     GERD   IBS          Musculoskeletal:  Musculoskeletal Normal                Neurological:  Neurology Normal                                      Endocrine:  Endocrine Normal            Dermatological:  Skin Normal    Psych:  Psychiatric Normal                    Physical Exam  General: Well nourished, Cooperative, Alert and Oriented    Airway:  Mallampati: II   Mouth Opening: Normal  TM Distance: Normal  Neck ROM: Normal ROM    Dental:  Intact, Partial Dentures, Caps / Implants        Anesthesia Plan  Type of Anesthesia, risks & benefits discussed:    Anesthesia Type: Gen ETT  Intra-op Monitoring Plan: Standard ASA Monitors  Post Op Pain Control Plan: multimodal analgesia  Induction:  IV  Airway Plan: Video, Post-Induction  Informed Consent: Informed consent signed with the Patient and all parties understand the risks and agree with anesthesia plan.  All questions answered.   ASA Score: 2  Anesthesia Plan Notes: Normal labs in Care Everywhere, T&S    Ready For Surgery From Anesthesia Perspective.     .

## 2024-01-01 DIAGNOSIS — N99.3 VAGINAL VAULT PROLAPSE AFTER HYSTERECTOMY: Primary | ICD-10-CM

## 2024-01-01 RX ORDER — FAMOTIDINE 20 MG/1
20 TABLET, FILM COATED ORAL
Status: CANCELLED | OUTPATIENT
Start: 2024-01-01

## 2024-01-01 RX ORDER — CEFAZOLIN SODIUM 2 G/50ML
2 SOLUTION INTRAVENOUS
Status: CANCELLED | OUTPATIENT
Start: 2024-01-01

## 2024-01-01 RX ORDER — HEPARIN SODIUM 5000 [USP'U]/ML
5000 INJECTION, SOLUTION INTRAVENOUS; SUBCUTANEOUS EVERY 8 HOURS
Status: CANCELLED | OUTPATIENT
Start: 2024-01-01

## 2024-01-01 RX ORDER — MUPIROCIN 20 MG/G
OINTMENT TOPICAL
Status: CANCELLED | OUTPATIENT
Start: 2024-01-01

## 2024-01-04 ENCOUNTER — ANESTHESIA (OUTPATIENT)
Dept: SURGERY | Facility: OTHER | Age: 64
End: 2024-01-04
Payer: COMMERCIAL

## 2024-01-04 ENCOUNTER — HOSPITAL ENCOUNTER (OUTPATIENT)
Facility: OTHER | Age: 64
Discharge: HOME | End: 2024-01-04
Attending: OBSTETRICS & GYNECOLOGY | Admitting: OBSTETRICS & GYNECOLOGY
Payer: COMMERCIAL

## 2024-01-04 VITALS
RESPIRATION RATE: 18 BRPM | WEIGHT: 147.5 LBS | OXYGEN SATURATION: 98 % | HEART RATE: 78 BPM | DIASTOLIC BLOOD PRESSURE: 60 MMHG | SYSTOLIC BLOOD PRESSURE: 130 MMHG | HEIGHT: 62 IN | TEMPERATURE: 98 F | BODY MASS INDEX: 27.14 KG/M2

## 2024-01-04 DIAGNOSIS — Z98.890 S/P SACROCOLPOPEXY: Primary | ICD-10-CM

## 2024-01-04 DIAGNOSIS — N99.3 VAGINAL VAULT PROLAPSE AFTER HYSTERECTOMY: ICD-10-CM

## 2024-01-04 PROCEDURE — C1771 REP DEV, URINARY, W/SLING: HCPCS | Performed by: OBSTETRICS & GYNECOLOGY

## 2024-01-04 PROCEDURE — 71000033 HC RECOVERY, INTIAL HOUR: Performed by: OBSTETRICS & GYNECOLOGY

## 2024-01-04 PROCEDURE — 63600175 PHARM REV CODE 636 W HCPCS: Performed by: ANESTHESIOLOGY

## 2024-01-04 PROCEDURE — D9220A PRA ANESTHESIA: Mod: CRNA,,, | Performed by: STUDENT IN AN ORGANIZED HEALTH CARE EDUCATION/TRAINING PROGRAM

## 2024-01-04 PROCEDURE — 63600175 PHARM REV CODE 636 W HCPCS: Performed by: STUDENT IN AN ORGANIZED HEALTH CARE EDUCATION/TRAINING PROGRAM

## 2024-01-04 PROCEDURE — 36000710: Performed by: OBSTETRICS & GYNECOLOGY

## 2024-01-04 PROCEDURE — 37000008 HC ANESTHESIA 1ST 15 MINUTES: Performed by: OBSTETRICS & GYNECOLOGY

## 2024-01-04 PROCEDURE — 25000003 PHARM REV CODE 250: Performed by: ANESTHESIOLOGY

## 2024-01-04 PROCEDURE — 57288 REPAIR BLADDER DEFECT: CPT | Mod: AS,51,, | Performed by: PHYSICIAN ASSISTANT

## 2024-01-04 PROCEDURE — 27201423 OPTIME MED/SURG SUP & DEVICES STERILE SUPPLY: Performed by: OBSTETRICS & GYNECOLOGY

## 2024-01-04 PROCEDURE — 25000003 PHARM REV CODE 250: Performed by: OBSTETRICS & GYNECOLOGY

## 2024-01-04 PROCEDURE — 37000009 HC ANESTHESIA EA ADD 15 MINS: Performed by: OBSTETRICS & GYNECOLOGY

## 2024-01-04 PROCEDURE — D9220A PRA ANESTHESIA: Mod: ANES,,, | Performed by: ANESTHESIOLOGY

## 2024-01-04 PROCEDURE — 57425 LAPAROSCOPY SURG COLPOPEXY: CPT | Mod: ,,, | Performed by: OBSTETRICS & GYNECOLOGY

## 2024-01-04 PROCEDURE — 71000016 HC POSTOP RECOV ADDL HR: Performed by: OBSTETRICS & GYNECOLOGY

## 2024-01-04 PROCEDURE — 25000003 PHARM REV CODE 250: Performed by: STUDENT IN AN ORGANIZED HEALTH CARE EDUCATION/TRAINING PROGRAM

## 2024-01-04 PROCEDURE — 57425 LAPAROSCOPY SURG COLPOPEXY: CPT | Mod: AS,,, | Performed by: PHYSICIAN ASSISTANT

## 2024-01-04 PROCEDURE — 63600175 PHARM REV CODE 636 W HCPCS: Performed by: OBSTETRICS & GYNECOLOGY

## 2024-01-04 PROCEDURE — 71000039 HC RECOVERY, EACH ADD'L HOUR: Performed by: OBSTETRICS & GYNECOLOGY

## 2024-01-04 PROCEDURE — P9045 ALBUMIN (HUMAN), 5%, 250 ML: HCPCS | Mod: JZ,JG | Performed by: STUDENT IN AN ORGANIZED HEALTH CARE EDUCATION/TRAINING PROGRAM

## 2024-01-04 PROCEDURE — 57288 REPAIR BLADDER DEFECT: CPT | Mod: 51,,, | Performed by: OBSTETRICS & GYNECOLOGY

## 2024-01-04 PROCEDURE — 63600175 PHARM REV CODE 636 W HCPCS: Mod: JG | Performed by: OBSTETRICS & GYNECOLOGY

## 2024-01-04 PROCEDURE — C1781 MESH (IMPLANTABLE): HCPCS | Performed by: OBSTETRICS & GYNECOLOGY

## 2024-01-04 PROCEDURE — 36000711: Performed by: OBSTETRICS & GYNECOLOGY

## 2024-01-04 PROCEDURE — 71000015 HC POSTOP RECOV 1ST HR: Performed by: OBSTETRICS & GYNECOLOGY

## 2024-01-04 DEVICE — MESH PELV UPSYLON BLUE Y SHAPE: Type: IMPLANTABLE DEVICE | Site: ABDOMEN | Status: FUNCTIONAL

## 2024-01-04 DEVICE — SYS TVT OBTURATOR: Type: IMPLANTABLE DEVICE | Site: ABDOMEN | Status: FUNCTIONAL

## 2024-01-04 RX ORDER — ALBUMIN HUMAN 50 G/1000ML
SOLUTION INTRAVENOUS
Status: DISCONTINUED | OUTPATIENT
Start: 2024-01-04 | End: 2024-01-04

## 2024-01-04 RX ORDER — LIDOCAINE HYDROCHLORIDE 10 MG/ML
0.5 INJECTION, SOLUTION EPIDURAL; INFILTRATION; INTRACAUDAL; PERINEURAL ONCE
Status: DISCONTINUED | OUTPATIENT
Start: 2024-01-04 | End: 2024-01-04 | Stop reason: HOSPADM

## 2024-01-04 RX ORDER — PROPOFOL 10 MG/ML
VIAL (ML) INTRAVENOUS
Status: DISCONTINUED | OUTPATIENT
Start: 2024-01-04 | End: 2024-01-04

## 2024-01-04 RX ORDER — SODIUM CHLORIDE, SODIUM LACTATE, POTASSIUM CHLORIDE, CALCIUM CHLORIDE 600; 310; 30; 20 MG/100ML; MG/100ML; MG/100ML; MG/100ML
INJECTION, SOLUTION INTRAVENOUS CONTINUOUS
Status: DISCONTINUED | OUTPATIENT
Start: 2024-01-04 | End: 2024-01-04 | Stop reason: HOSPADM

## 2024-01-04 RX ORDER — SODIUM CHLORIDE 0.9 % (FLUSH) 0.9 %
3 SYRINGE (ML) INJECTION
Status: DISCONTINUED | OUTPATIENT
Start: 2024-01-04 | End: 2024-01-04 | Stop reason: HOSPADM

## 2024-01-04 RX ORDER — KETAMINE HCL IN 0.9 % NACL 50 MG/5 ML
SYRINGE (ML) INTRAVENOUS
Status: DISCONTINUED | OUTPATIENT
Start: 2024-01-04 | End: 2024-01-04

## 2024-01-04 RX ORDER — PHENYLEPHRINE HYDROCHLORIDE 10 MG/ML
INJECTION INTRAVENOUS
Status: DISCONTINUED | OUTPATIENT
Start: 2024-01-04 | End: 2024-01-04

## 2024-01-04 RX ORDER — IBUPROFEN 600 MG/1
600 TABLET ORAL EVERY 6 HOURS
Qty: 30 TABLET | Refills: 1 | Status: SHIPPED | OUTPATIENT
Start: 2024-01-04 | End: 2024-02-20

## 2024-01-04 RX ORDER — ONDANSETRON HYDROCHLORIDE 2 MG/ML
INJECTION, SOLUTION INTRAMUSCULAR; INTRAVENOUS
Status: DISCONTINUED | OUTPATIENT
Start: 2024-01-04 | End: 2024-01-04

## 2024-01-04 RX ORDER — OXYCODONE HYDROCHLORIDE 5 MG/1
5 TABLET ORAL EVERY 4 HOURS PRN
Qty: 20 TABLET | Refills: 0 | Status: SHIPPED | OUTPATIENT
Start: 2024-01-04 | End: 2024-02-20

## 2024-01-04 RX ORDER — MUPIROCIN 20 MG/G
OINTMENT TOPICAL
Status: DISCONTINUED | OUTPATIENT
Start: 2024-01-04 | End: 2024-01-04 | Stop reason: HOSPADM

## 2024-01-04 RX ORDER — DEXTROMETHORPHAN HYDROBROMIDE, GUAIFENESIN 5; 100 MG/5ML; MG/5ML
650 LIQUID ORAL EVERY 6 HOURS
Qty: 30 TABLET | Refills: 1 | Status: SHIPPED | OUTPATIENT
Start: 2024-01-04 | End: 2024-02-20

## 2024-01-04 RX ORDER — PROCHLORPERAZINE EDISYLATE 5 MG/ML
5 INJECTION INTRAMUSCULAR; INTRAVENOUS EVERY 30 MIN PRN
Status: DISCONTINUED | OUTPATIENT
Start: 2024-01-04 | End: 2024-01-04 | Stop reason: HOSPADM

## 2024-01-04 RX ORDER — FAMOTIDINE 20 MG/1
20 TABLET, FILM COATED ORAL
Status: COMPLETED | OUTPATIENT
Start: 2024-01-04 | End: 2024-01-04

## 2024-01-04 RX ORDER — LIDOCAINE HYDROCHLORIDE 20 MG/ML
INJECTION, SOLUTION EPIDURAL; INFILTRATION; INTRACAUDAL; PERINEURAL
Status: DISCONTINUED | OUTPATIENT
Start: 2024-01-04 | End: 2024-01-04

## 2024-01-04 RX ORDER — DEXAMETHASONE SODIUM PHOSPHATE 4 MG/ML
INJECTION, SOLUTION INTRA-ARTICULAR; INTRALESIONAL; INTRAMUSCULAR; INTRAVENOUS; SOFT TISSUE
Status: DISCONTINUED | OUTPATIENT
Start: 2024-01-04 | End: 2024-01-04

## 2024-01-04 RX ORDER — OXYCODONE HYDROCHLORIDE 5 MG/1
5 TABLET ORAL EVERY 4 HOURS PRN
Status: DISCONTINUED | OUTPATIENT
Start: 2024-01-04 | End: 2024-01-04 | Stop reason: HOSPADM

## 2024-01-04 RX ORDER — MEPERIDINE HYDROCHLORIDE 25 MG/ML
12.5 INJECTION INTRAMUSCULAR; INTRAVENOUS; SUBCUTANEOUS ONCE AS NEEDED
Status: DISCONTINUED | OUTPATIENT
Start: 2024-01-04 | End: 2024-01-04 | Stop reason: HOSPADM

## 2024-01-04 RX ORDER — ROCURONIUM BROMIDE 10 MG/ML
INJECTION, SOLUTION INTRAVENOUS
Status: DISCONTINUED | OUTPATIENT
Start: 2024-01-04 | End: 2024-01-04

## 2024-01-04 RX ORDER — CEFAZOLIN SODIUM 1 G/3ML
2 INJECTION, POWDER, FOR SOLUTION INTRAMUSCULAR; INTRAVENOUS
Status: DISCONTINUED | OUTPATIENT
Start: 2024-01-04 | End: 2024-01-04 | Stop reason: HOSPADM

## 2024-01-04 RX ORDER — MIDAZOLAM HYDROCHLORIDE 1 MG/ML
INJECTION INTRAMUSCULAR; INTRAVENOUS
Status: DISCONTINUED | OUTPATIENT
Start: 2024-01-04 | End: 2024-01-04

## 2024-01-04 RX ORDER — DIPHENHYDRAMINE HYDROCHLORIDE 50 MG/ML
12.5 INJECTION, SOLUTION INTRAMUSCULAR; INTRAVENOUS EVERY 30 MIN PRN
Status: DISCONTINUED | OUTPATIENT
Start: 2024-01-04 | End: 2024-01-04 | Stop reason: HOSPADM

## 2024-01-04 RX ORDER — FENTANYL CITRATE 50 UG/ML
INJECTION, SOLUTION INTRAMUSCULAR; INTRAVENOUS
Status: DISCONTINUED | OUTPATIENT
Start: 2024-01-04 | End: 2024-01-04

## 2024-01-04 RX ORDER — HEPARIN SODIUM 5000 [USP'U]/ML
5000 INJECTION, SOLUTION INTRAVENOUS; SUBCUTANEOUS
Status: COMPLETED | OUTPATIENT
Start: 2024-01-04 | End: 2024-01-04

## 2024-01-04 RX ORDER — KETOROLAC TROMETHAMINE 30 MG/ML
INJECTION, SOLUTION INTRAMUSCULAR; INTRAVENOUS
Status: DISCONTINUED | OUTPATIENT
Start: 2024-01-04 | End: 2024-01-04

## 2024-01-04 RX ORDER — BUPIVACAINE HYDROCHLORIDE AND EPINEPHRINE 2.5; 5 MG/ML; UG/ML
INJECTION, SOLUTION EPIDURAL; INFILTRATION; INTRACAUDAL; PERINEURAL
Status: DISCONTINUED | OUTPATIENT
Start: 2024-01-04 | End: 2024-01-04 | Stop reason: HOSPADM

## 2024-01-04 RX ORDER — ACETAMINOPHEN 500 MG
1000 TABLET ORAL
Status: COMPLETED | OUTPATIENT
Start: 2024-01-04 | End: 2024-01-04

## 2024-01-04 RX ORDER — OXYCODONE HYDROCHLORIDE 5 MG/1
5 TABLET ORAL ONCE
Status: COMPLETED | OUTPATIENT
Start: 2024-01-04 | End: 2024-01-04

## 2024-01-04 RX ORDER — HYDROMORPHONE HYDROCHLORIDE 2 MG/ML
0.4 INJECTION, SOLUTION INTRAMUSCULAR; INTRAVENOUS; SUBCUTANEOUS EVERY 5 MIN PRN
Status: DISCONTINUED | OUTPATIENT
Start: 2024-01-04 | End: 2024-01-04 | Stop reason: HOSPADM

## 2024-01-04 RX ORDER — BUPIVACAINE HYDROCHLORIDE 5 MG/ML
INJECTION, SOLUTION EPIDURAL; INTRACAUDAL
Status: DISCONTINUED | OUTPATIENT
Start: 2024-01-04 | End: 2024-01-04 | Stop reason: HOSPADM

## 2024-01-04 RX ADMIN — GLYCOPYRROLATE 0.2 MG: 0.2 INJECTION, SOLUTION INTRAMUSCULAR; INTRAVITREAL at 07:01

## 2024-01-04 RX ADMIN — PROPOFOL 200 MG: 10 INJECTION, EMULSION INTRAVENOUS at 07:01

## 2024-01-04 RX ADMIN — SODIUM CHLORIDE, SODIUM LACTATE, POTASSIUM CHLORIDE, AND CALCIUM CHLORIDE: 600; 310; 30; 20 INJECTION, SOLUTION INTRAVENOUS at 07:01

## 2024-01-04 RX ADMIN — ROCURONIUM BROMIDE 50 MG: 10 SOLUTION INTRAVENOUS at 07:01

## 2024-01-04 RX ADMIN — PHENYLEPHRINE HYDROCHLORIDE 100 MCG: 10 INJECTION INTRAVENOUS at 07:01

## 2024-01-04 RX ADMIN — Medication 30 MG: at 07:01

## 2024-01-04 RX ADMIN — SUGAMMADEX 200 MG: 100 INJECTION, SOLUTION INTRAVENOUS at 11:01

## 2024-01-04 RX ADMIN — FENTANYL CITRATE 50 MCG: 0.05 INJECTION, SOLUTION INTRAMUSCULAR; INTRAVENOUS at 10:01

## 2024-01-04 RX ADMIN — PHENYLEPHRINE HYDROCHLORIDE 100 MCG: 10 INJECTION INTRAVENOUS at 08:01

## 2024-01-04 RX ADMIN — ROCURONIUM BROMIDE 15 MG: 10 SOLUTION INTRAVENOUS at 09:01

## 2024-01-04 RX ADMIN — PROPOFOL 50 MG: 10 INJECTION, EMULSION INTRAVENOUS at 10:01

## 2024-01-04 RX ADMIN — Medication 10 MG: at 08:01

## 2024-01-04 RX ADMIN — DEXAMETHASONE SODIUM PHOSPHATE 8 MG: 4 INJECTION, SOLUTION INTRAMUSCULAR; INTRAVENOUS at 07:01

## 2024-01-04 RX ADMIN — SUGAMMADEX 200 MG: 100 INJECTION, SOLUTION INTRAVENOUS at 12:01

## 2024-01-04 RX ADMIN — FAMOTIDINE 20 MG: 20 TABLET ORAL at 06:01

## 2024-01-04 RX ADMIN — Medication 10 MG: at 10:01

## 2024-01-04 RX ADMIN — KETOROLAC TROMETHAMINE 30 MG: 30 INJECTION, SOLUTION INTRAMUSCULAR; INTRAVENOUS at 11:01

## 2024-01-04 RX ADMIN — HYDROMORPHONE HYDROCHLORIDE 0.4 MG: 2 INJECTION INTRAMUSCULAR; INTRAVENOUS; SUBCUTANEOUS at 12:01

## 2024-01-04 RX ADMIN — FENTANYL CITRATE 100 MCG: 0.05 INJECTION, SOLUTION INTRAMUSCULAR; INTRAVENOUS at 07:01

## 2024-01-04 RX ADMIN — MIDAZOLAM HYDROCHLORIDE 2 MG: 1 INJECTION, SOLUTION INTRAMUSCULAR; INTRAVENOUS at 07:01

## 2024-01-04 RX ADMIN — OXYCODONE HYDROCHLORIDE 5 MG: 5 TABLET ORAL at 12:01

## 2024-01-04 RX ADMIN — ROCURONIUM BROMIDE 20 MG: 10 SOLUTION INTRAVENOUS at 07:01

## 2024-01-04 RX ADMIN — ROCURONIUM BROMIDE 10 MG: 10 SOLUTION INTRAVENOUS at 10:01

## 2024-01-04 RX ADMIN — PHENYLEPHRINE HYDROCHLORIDE 100 MCG: 10 INJECTION INTRAVENOUS at 10:01

## 2024-01-04 RX ADMIN — ALBUMIN (HUMAN) 500 ML: 12.5 SOLUTION INTRAVENOUS at 08:01

## 2024-01-04 RX ADMIN — OXYCODONE HYDROCHLORIDE 5 MG: 5 TABLET ORAL at 02:01

## 2024-01-04 RX ADMIN — INDIGOTINDISULFONATE SODIUM 40 MG: 8 INJECTION INTRAVENOUS at 10:01

## 2024-01-04 RX ADMIN — LIDOCAINE HYDROCHLORIDE 80 MG: 20 INJECTION, SOLUTION EPIDURAL; INFILTRATION; INTRACAUDAL; PERINEURAL at 07:01

## 2024-01-04 RX ADMIN — CEFAZOLIN 2 G: 330 INJECTION, POWDER, FOR SOLUTION INTRAMUSCULAR; INTRAVENOUS at 07:01

## 2024-01-04 RX ADMIN — CEFAZOLIN 2 G: 330 INJECTION, POWDER, FOR SOLUTION INTRAMUSCULAR; INTRAVENOUS at 11:01

## 2024-01-04 RX ADMIN — HEPARIN SODIUM 5000 UNITS: 5000 INJECTION INTRAVENOUS; SUBCUTANEOUS at 06:01

## 2024-01-04 RX ADMIN — ACETAMINOPHEN 1000 MG: 500 TABLET ORAL at 06:01

## 2024-01-04 RX ADMIN — MUPIROCIN: 20 OINTMENT TOPICAL at 06:01

## 2024-01-04 RX ADMIN — ROCURONIUM BROMIDE 15 MG: 10 SOLUTION INTRAVENOUS at 08:01

## 2024-01-04 RX ADMIN — ONDANSETRON 4 MG: 2 INJECTION INTRAMUSCULAR; INTRAVENOUS at 11:01

## 2024-01-04 NOTE — TRANSFER OF CARE
"Anesthesia Transfer of Care Note    Patient: Nicolasa Bean    Procedure(s) Performed: Procedure(s) (LRB):  SACROCOLPOPEXY, LAPAROSCOPIC (N/A)  SLING, MIDURETHRAL (N/A)  CYSTOSCOPY (N/A)  PERINEOPLASTY (N/A)    Patient location: PACU    Anesthesia Type: general    Transport from OR: Transported from OR on 2-3 L/min O2 by NC with adequate spontaneous ventilation    Post pain: adequate analgesia    Post assessment: no apparent anesthetic complications and tolerated procedure well    Post vital signs: stable    Level of consciousness: awake    Nausea/Vomiting: no nausea/vomiting    Complications: none    Transfer of care protocol was followed      Last vitals: Visit Vitals  BP (!) 160/76 (BP Location: Right arm, Patient Position: Lying)   Pulse 80   Temp 36.1 °C (97 °F) (Skin)   Resp 16   Ht 5' 2" (1.575 m)   Wt 66.9 kg (147 lb 7.8 oz)   SpO2 100%   Breastfeeding No   BMI 26.98 kg/m²     "

## 2024-01-04 NOTE — OR NURSING
Spoke with dr. Patel regarding patients back pain 7/10 patient states she suffers with chronic back pain . Throbbing aching .  Orders received

## 2024-01-04 NOTE — ANESTHESIA POSTPROCEDURE EVALUATION
Anesthesia Post Evaluation    Patient: Nicolasa Bean    Procedure(s) Performed: Procedure(s) (LRB):  SACROCOLPOPEXY, LAPAROSCOPIC (N/A)  SLING, MIDURETHRAL (N/A)  CYSTOSCOPY (N/A)  PERINEOPLASTY (N/A)    Final Anesthesia Type: general      Patient location during evaluation: PACU  Patient participation: Yes- Able to Participate  Level of consciousness: awake and alert  Post-procedure vital signs: reviewed and stable  Pain management: adequate  Airway patency: patent    PONV status at discharge: No PONV  Anesthetic complications: no      Cardiovascular status: blood pressure returned to baseline  Respiratory status: unassisted  Hydration status: euvolemic  Follow-up not needed.              Vitals Value Taken Time   /60 01/04/24 1345   Temp 36.5 °C (97.7 °F) 01/04/24 1345   Pulse 83 01/04/24 1345   Resp 18 01/04/24 1345   SpO2 95 % 01/04/24 1334   Vitals shown include unvalidated device data.      Event Time   Out of Recovery 13:38:00         Pain/Denver Score: Pain Rating Prior to Med Admin: 8 (1/4/2024 12:38 PM)  Pain Rating Post Med Admin: 0 (1/4/2024  1:15 PM)  Denver Score: 10 (1/4/2024  1:45 PM)

## 2024-01-04 NOTE — OP NOTE
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 Normal appearing bladder without foreign body or evidence of trocar perforation. Normal appearing urethra. Bilateral strong ureteral efflux noted.     Description of Technical Procedures:     The patient was taken to the operating room, where general anesthesia was given and found to be adequate. The patient was then placed in the dorsal lithotomy position and prepped and draped in normal sterile fashion. Exam under anesthesia revealed stage 2 vaginal vault prolapse. A Moran catheter was placed to drain the bladder. Attention was then placed in the patient's abdomen where 0.5% Marcaine was injected through the umbilicus. A scalpel was used to make a stab incision through the umbilicus. Using a Veress needle, laparoscopic entry was confirmed and pneumo insufflation was obtained with 4 liters of CO2. Afterwards a 5 mm laparoscopic trocar inserted under direct visualization without damage to underlying structures.     Visualization of the abdominal cavity showed the above noted findings. Both ureters were identified. Two 5mm right and left satish-umbilical ports were placed under direct  visualization.      Next, a 11 mm suprapubic port was placed under direct visualization prior to which marcaine was injected. Next, an incision was made in the peritoneum overlying the sacral promontory, and the middle sacral vessels were identified as was the anterior longitudinal ligament of the sacrum. An incision using monopolar scissors was then carried out down along the right pelvic sidewall away from the ureter and just medial to the uterosacral ligament and away from the rectum. Next, the bladder was back-filled with about 100 mL of sterile water and the bladder was taken down off the anterior anterior vagina using sharp dissection and electrocautery used sparingly. The bladder was drained and attention was next turned to dissecting the peritoneum off of the posterior aspect of the vagina. This incision was carried across and into the rectovaginal space. This was carried down for almost the full length of the vagina.       The Upsylon Y mesh was prepared and introduced into the abdomen. The sacral tail of the mesh was marked on the right side to aid in tensioning and was tied after being rolled up to keep it out of the way. One side of the Y portion of the mesh was trimmed in a semi-circular fashion to nate the anterior side. Once in the abdominal cavity the Y-portion of the mesh was placed over the vagina which was elevated with EEA sizers. The anterior portion was sutured to the vagina with CV-2 Dousman-Frank sutures using extracorporeal knot tying technique and a [employer] needle . When this was accomplished, the posterior mesh was attached to the vagina with CV-2 Dousman-Frank sutures. A vaginal probe was used to provide tensioning during suturing. The suture holding the tail was cut, the tail was unravelled and sutured to the anterior longitudinal ligament after proper tensioning. Tensioning was performed by pushing the vaginal probe towards the sacrum. Two CV2 sutures were used. Hemostasis noted. The  peritoneum was then closed over the mesh using a 3-0 V-lock barbed suture. At the end of this procedure there were no exposed mesh noted.     The fascial incision closed with 0-vicryl. With the help of a [employer] device. All gas was extruded and the skin incisions closed with 4.0 monocryl.    Given the patient's findings of stress incontinence, attention was turned to the TVT obturator procedure.With a finger in the vagina, the obturator foramen was palpated bilaterally. Local anesthetic was injected into the skin at the lateral thigh folds and into the obturator membrane and the deep tissues of both thighs. Using gentle traction on the Moran catheter to identify the bladder neck, local anesthetic was injected into the vaginal mucosa and submucosal tissues in the midline and bilaterally at the level of the midurethra. A small sagittal incision, approximately 1.5 cm, was made in the midline of the anterior vaginal wall, 1.5 cm proximal to the external urethral meatus. Metzenbaum scissors were used to free the vaginal wall from the urethra and develop the small periurethral space on the patient's right side heading towards the obturator foramen.    With the scissors closed and at a 45 degree angle, the superior medial portion of the obturator membrane was pierced. The scissors were removed and replaced by the winged guide, which was passed through the same portion of the obturator membrane. The right sided TVT-O helical passer with attached mesh was then passed through the previously created defect in the obturator membrane, following the course of the winged guide.    Once the passer was through the obturator membrane, the winged guide was removed. The helical needle was rotated and brought into position, such that the handle of the passer was perpendicular to the floor. In this manner, the tip of the passer was brought through the obturator foramen and adductor muscles of the thigh. Using the knife, a small  incision was made in the skin to allow the tip of the passer to exit the skin. The helical needle was then withdrawn from the plastic tubing, and the plastic tubing was cut off after the mesh and sheath were brought through the skin incision on the thigh. This technique was repeated in a similar fashion on the patient's left side, with care taken to assure that the mesh lay flat underneath the urethra. The Moran catheter was removed and the cystoscopy was performed to exclude unintentional bladder perforation. After the bladder integrity was confirmed, the Moran catheter was replaced and a 4 mm portion of the tape with the plastic sheaths pulled back, was grasped at the midline using a Dianelys clamp. Gentle traction on the lateral ends of the tape relieved any excess tape material and brought the tape and Dianelys clamp into contact with the periurethral tissue. Once the tension was adjusted in the usual fashion, the plastic sheaths were removed. Then the mesh previously grasped by the Dianelys clamp was released, causing it to lie flat underneath the urethra without tension. The lateral ends of the tape were cut just below the surface of the skin. Finally, the thigh incisions were closed with steri strips and the vaginal incision was closed using a running delayed absorbable suture. Good hemostasis was maintained throughout the procedure.    Next, two  Allis clamps were used to nate the hymenal edges at the posterior fourchette. Care was taken to make sure that the vaginal opening remained at least 2-3 finger breadths wide at the end of the perineorrhaphy. The perineal body and posterior vaginal epithelium was infiltrated with local anesthetic after a angelica shaped area was marked on the posterior vaginal epithelium and perineal body. Using electrocautery a perineal skin incision was made. The perineal skin was dissected sharply from the underlying perineal muscles and rectovaginal septum using patiño scissors. This  dissection was carried to the posterior vaginal epithelium following the previously marked angelica. Afterwards the angelica shaped patch of perineal skin and posterior vaginal epithelium was excised. Next the posterior vaginal epithelium was approximated using 2-0 vicryl suture until the level of the hymen. 0-vicryl suture were used to plicate the perineal muscles using interrupted stitches. The 2-0 vicryl used to plicate the vaginal epithelium was used to close the perineal skin completing the posterior repair and perineorrhaphy.     Patient tolerated the procedure well. She was taken to the recovery room in stable condition. Laps, instrument, needle count were correct.    Dr. Miguel was present and scrubbed for the entirety of the procedure.    Estimated Blood Loss (EBL): Minimal         Implants:   Implant Name Type Inv. Item Serial No.  Lot No. LRB No. Used Action   MESH PELV UPSYLON BLUE Y SHAPE - [id number]  MESH PELV UPSYLON BLUE Y SHAPE  [employer] [id number] N/A 1 Implanted   SYS TVT OBTURATOR - [id number]  SYS TVT OBTURATOR  [employer] [id number] N/A 1 Implanted       Specimens:   Specimen (24h ago, onward)      None                    Condition: Good    Disposition: PACU - hemodynamically stable.    Attestation: I was present and scrubbed for the entire procedure.    Glenna Miguel MD

## 2024-01-04 NOTE — INTERVAL H&P NOTE
The patient has been examined and the H&P has been reviewed:    I concur with the findings and no changes have occurred since H&P was written.    Anesthesia/Surgery risks, benefits and alternative options discussed and understood by patient/family. Surgery consents signed previously in clinic and in media.    To OR for Laparoscopic sacrocolpopexy, possible uterosacral suspension, possible anterior/posterior repair with perineorrhaphy, placement of TVT-O and cysto.     Lelia Georges MD  PGY-3 OB/GYN

## 2024-01-04 NOTE — PLAN OF CARE
Nicolasa Bean has met all discharge criteria from Phase II. Vital Signs are stable, ambulating  without difficulty. Discharge instructions given, patient verbalized understanding. Discharged from facility via wheelchair in stable condition.

## 2024-01-04 NOTE — OPERATIVE NOTE ADDENDUM
Certification of Assistant at Surgery       Surgery Date: 1/4/2024     Participating Surgeons:  Surgeon(s) and Role:     * Glenna Miguel MD - Primary    Procedures:  Procedure(s) (LRB):  SACROCOLPOPEXY, LAPAROSCOPIC (N/A)  COLPORRHAPHY, COMBINED ANTEROPOSTERIOR (N/A)  REPAIR, PERINEUM (N/A)  SLING, MIDURETHRAL (N/A)  CYSTOSCOPY (N/A)  FIXATION, LIGAMENT, SACROSPINOUS (N/A)    Assistant Surgeon's Certification of Necessity:  I understand that section 1842 (b) (6) (d) of the Social Security Act generally prohibits Medicare Part B reasonable charge payment for the services of assistants at surgery in teaching hospitals when qualified residents are available to furnish such services. I certify that the services for which payment is claimed were medically necessary, and that no qualified resident was available to perform the services. I further understand that these services are subject to post-payment review by the Medicare carrier.      Rodríguez Walton PA-C    01/04/2024  11:23 AM

## 2024-01-04 NOTE — DISCHARGE SUMMARY
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 Normal appearing bladder without foreign body or evidence of trocar perforation. Normal appearing urethra. Bilateral strong ureteral efflux noted.     Estimated Blood Loss: * No values recorded between 1/4/2024  7:34 AM and 1/4/2024 12:08 PM *         Specimens:   Specimen (24h ago, onward)      None            Discharge Provider: Lelia Georges    Diagnoses:  Active Hospital Problems    Diagnosis  POA    S/P laparoscopic sacrocolpopexy/perineorrhaphy/TVT-O/cystoscopy [Z98.890]  Not Applicable      Resolved Hospital Problems   No resolved problems to display.       Discharged Condition: good    Hospital Course:   Patient was admitted for outpatient procedure as above, and tolerated the procedure well with no complications. Please see operative report for further details. Following the procedure, the patient was awakened from anesthesia and transferred to the recovery area in stable condition. She was discharged to home once ambulating, voiding, tolerating PO intake, and pain was well-controlled. Patient was given routine post-op instructions and prescriptions for pain medication to take as needed. Patient instructed to follow up with   Myriam in 4-6 weeks.    Final Diagnoses: Same as principal problem.    Disposition: Home or Self Care    Follow up/Patient Instructions:    Medications:  Reconciled Home Medications:      Medication List        START taking these medications      acetaminophen 650 MG Tbsr  Commonly known as: TYLENOL  Take 1 tablet (650 mg total) by mouth every 6 (six) hours.     ibuprofen 600 MG tablet  Commonly known as: ADVIL,MOTRIN  Take 1 tablet (600 mg total) by mouth every 6 (six) hours.     oxyCODONE 5 MG immediate release tablet  Commonly known as: ROXICODONE  Take 1 tablet (5 mg total) by mouth every 4 (four) hours as needed for Pain.            CONTINUE taking these medications      BIOTIN ORAL  Take 1 tablet by mouth.     cetirizine 10 MG tablet  Commonly known as: ZYRTEC  Take 1 tablet by mouth once daily.     ipratropium 42 mcg (0.06 %) nasal spray  Commonly known as: ATROVENT  2 sprays by Nasal route 3 (three) times daily as needed.     LINZESS 145 mcg Cap capsule  Generic drug: linaCLOtide  Take 145 mcg by mouth every morning.     mupirocin 2 % ointment  Commonly known as: BACTROBAN  Apply topically 2 (two) times daily.     pantoprazole 40 MG tablet  Commonly known as: PROTONIX  Take 40 mg by mouth every morning.     spironolactone 25 MG tablet  Commonly known as: ALDACTONE  Take 25 mg by mouth once daily.     UNABLE TO FIND  every 3 (three) months. Hormone pellets            STOP taking these medications      PREMARIN VAGL            Discharge Procedure Orders   Diet general     Lifting restrictions   Order Comments: No lifting greater than 15 pounds for six weeks.     Other restrictions (specify):   Order Comments: PELVIC REST:  No douching, tampons, or intercourse until cleared by MD    DRIVING:  No driving while on narcotics. Driving may be resumed initially with a competent passenger one to two weeks after surgery if no longer taking narcotics.     EXERCISE:  For six weeks your exercise should be limited to  walking. You may walk as far as you wish, as long as you increase your level of exertion gradually and avoid slippery surfaces. You may climb stairs as needed to get around, but should not use stair climbing for exercise.     Remove dressing in 24 hours   Order Comments: If you have a bandage on wound, you may remove it the day after dismissal.  If you had steri-strips remove them once they begin to peel off (usually 2 weeks). Keep incision clean and dry.  Inspect the incision daily for signs and symptoms of infection.     Wound care routine (specify)   Order Comments: WOUND CARE:  If you have a band-aid or bandage on your wound, you may remove it the day after dismissal.  If you had steri-strips remove them once they begin to peel off (usually 2 weeks).  If your steri-strips still haven't come off in 2 weeks, please remove them. You may wash the wound with mild soap and water.   You may shower at any time but should avoid immersing any abdominal incisions in water for at least two weeks after surgery or until the wound is completely healed. If given, please shower with Hibiclens soap until bottle is completely finished. Keep your wound clean and dry.  You should observe your incision for signs of infection which include redness, warmth, drainage or fever.     Call MD for:  temperature >100.4     Call MD for:  persistent nausea and vomiting     Call MD for:  severe uncontrolled pain     Call MD for:  difficulty breathing, headache or visual disturbances     Call MD for:  redness, tenderness, or signs of infection (pain, swelling, redness, odor or green/yellow discharge around incision site)     Call MD for:  hives     Call MD for:   Order Comments: inability to void,urine is ketchup colored or you have large clots, vaginal bleeding is heavier than a period.    VAGINAL DISCHARGE: You may develop a vaginal discharge and intermittent vaginal spotting after surgery and up to 6 weeks postoperatively.  The discharge may  have an odor and may change in color but it is normal.  This is due to dissolving stiches.  Contact your surgical team if you develop vaginal or vulvar irritation along with a discharge.  Also contact your surgical team if you have vaginal discharge that smells like urine or stool.    CONSTIPATION REMEDIES: Patients are often constipated after surgery or with use of oral narcotic medicine. You should continue to take the stool softener, Senokot-S during the next six weeks, and consume adequate amounts of water.  If you have not had a bowel movement for 3 days after dismissal, or are uncomfortable and unable to pass stool, please try one or all of the following measures:  1.  Milk of Magnesia - 30 cc by mouth every 12 hours   2.  Dulcolax suppository - One suppository per rectum every 4-6 hours   3.  Metamucil, Fibercon or other bulk former - use as directed  4.  Fleets Enema    PAIN MEDICATIONS:   Take your pain medications as instructed. It is best to take pain medications before your pain becomes severe. This will allow you to take less medication yet have better pain relief. For the first 2 or 3 days it may be helpful to take your pain medications on a regular schedule (e.g. every 4 to 6 hours). This will help you to keep your pain under better control. You should then begin to take fewer medications each day until you no longer need them. Do not take pain medication on an empty stomach. This may lead to nausea and vomiting.     Call MD for:  extreme fatigue     Call MD for:  persistent dizziness or light-headedness     Activity as tolerated   Order Comments: Return to normal activity slowly as you feel able.  For 6 weeks your exercise should be limited to walking.  You may walk as far as you wish, as long as you increase your level of exertion gradually and avoid slippery surfaces.    If you had a hysterectomy at the surgery do not insert anything in your vagina for 9 weeks.     Shower on day dressing removed  (No bath)   Order Comments: You may shower at any time but should avoid immersing any abdominal incisions in water for at least 2 weeks after surgery or until the wound is completely healed.  If given, please shower with Hibaclens soap until bottle is completely finish      Follow-up Information       Glenna Miguel MD Follow up in 1 month(s).    Specialties: Obstetrics and Gynecology, UroGynecology   Why: Post op visit  Contact information:  [address] [address] [phone]                              Lelia Georges MD  PGY-3 OB/GYN

## 2024-01-04 NOTE — OR NURSING
Patient states she is very sleepy and cold.  Forced air warmer placed on patient and temp 98.1.  patient request to close her eyes for twenty minutes

## 2024-01-04 NOTE — OR NURSING
VSS on RA. Pt denies surgical pain. Dressings and PIV C/D/I. Moran in place and draining; voiding trial to be done in Phase II. Meets Phase I discharge criteria. Ready for transfer to Phase II. Significant Other notified.

## 2024-01-04 NOTE — ANESTHESIA PROCEDURE NOTES
Intubation    Date/Time: 1/4/2024 7:13 AM    Performed by: Shasta Gonzalez CRNA  Authorized by: Dylan Mathew MD    Intubation:     Induction:  Intravenous    Intubated:  Postinduction    Mask Ventilation:  Easy mask    Attempts:  1    Attempted By:  CRNA    Method of Intubation:  Video laryngoscopy    Blade:  Santos 3    Laryngeal View Grade: Grade I - full view of cords      Difficult Airway Encountered?: No      Complications:  None    Airway Device:  Oral endotracheal tube    Airway Device Size:  7.5    Style/Cuff Inflation:  Cuffed (inflated to minimal occlusive pressure)    Tube secured:  21    Secured at:  The lips    Placement Verified By:  Capnometry    Complicating Factors:  None    Findings Post-Intubation:  BS equal bilateral and atraumatic/condition of teeth unchanged

## 2024-01-05 ENCOUNTER — TELEPHONE (OUTPATIENT)
Dept: UROGYNECOLOGY | Facility: CLINIC | Age: 64
End: 2024-01-05
Payer: COMMERCIAL

## 2024-01-05 NOTE — TELEPHONE ENCOUNTER
Contacted patient to review how she is feeling. Reports a little soreness but controlled with pain medication. Able to urinate and was able to have a bowel movements. No concerns. Reviewed surgery with patient. She is aware to contact the office with any problems.   Glenna Miguel MD

## 2024-01-17 NOTE — PROGRESS NOTES
[facility] - UROGYNECOLOGY  [address] [address]  January 18, 2024     Nicolasa Bean  [id number]  1960    UROGYN POST-OP  1/18/2024     Nicolasa Bean is a 63 y.o. here for a post operative visit.    OPERATIVE NOTE  Date of Procedure: 1/4/2024      Procedure: Procedure(s) (LRB):  SACROCOLPOPEXY, LAPAROSCOPIC (N/A)  SLING, MIDURETHRAL (N/A)  CYSTOSCOPY (N/A)  PERINEOPLASTY (N/A)          HISTORY SINCE LAST VISIT:  Scant bleeding. Mild aching pain in vagina when sitting.  Rare lower abdominal pressure  Bladder issues: Denies ZA. Rare UUI if ignores urge. Denies Urinary urgency/ frequency/nocturia  Bowel issues: Denies constipation or straining. Taking linzess and miralax daily    Past Medical History:   Diagnosis Date    Diverticulitis     GERD (gastroesophageal reflux disease)     IBS (irritable bowel syndrome)        Past Surgical History:   Procedure Laterality Date    CYSTOSCOPY N/A 1/4/2024    Procedure: CYSTOSCOPY;  Surgeon: Glenna Miguel MD;  Location: [facility];  Service: OB/GYN;  Laterality: N/A;    HYSTERECTOMY  2004    with BSO    INSERTION OF MIDURETHRAL SLING N/A 1/4/2024    Procedure: SLING, MIDURETHRAL;  Surgeon: Glenna Miguel MD;  Location: [facility];  Service: OB/GYN;  Laterality: N/A;  TVT-O    LAPAROSCOPIC SACROCOLPOPEXY N/A 1/4/2024    Procedure: SACROCOLPOPEXY, LAPAROSCOPIC;  Surgeon: Glenna Miguel MD;  Location: [facility];  Service: OB/GYN;  Laterality: N/A;    PERINEOPLASTY N/A 1/4/2024    Procedure: PERINEOPLASTY;  Surgeon: Glenna Miguel MD;  Location: [facility];  Service: OB/GYN;  Laterality: N/A;    TUBAL LIGATION      vaginal sling      for ZA       Family History   Problem Relation Age of Onset    Diabetes Mother     Hypertension Mother     Heart failure Father     Hypertension Sister     Hypertension Sister        Social History     Socioeconomic History    Marital status: Single   Occupational History    Occupation:    Tobacco Use  "   Smoking status: Never    Smokeless tobacco: Never   Substance and Sexual Activity    Alcohol use: Yes     Comment: occ    Drug use: Never    Sexual activity: Yes     Partners: Male   Social History Narrative    Lives with significant other, Carlton. Together since 2009. Feels safe in her home and relationship.        Current Outpatient Medications   Medication Sig Dispense Refill    acetaminophen (TYLENOL) 650 MG TbSR Take 1 tablet (650 mg total) by mouth every 6 (six) hours. 30 tablet 1    BIOTIN ORAL Take 1 tablet by mouth.      cetirizine (ZYRTEC) 10 MG tablet Take 1 tablet by mouth once daily.      ibuprofen (ADVIL,MOTRIN) 600 MG tablet Take 1 tablet (600 mg total) by mouth every 6 (six) hours. 30 tablet 1    ipratropium (ATROVENT) 42 mcg (0.06 %) nasal spray 2 sprays by Nasal route 3 (three) times daily as needed.      LINZESS 145 mcg Cap capsule Take 145 mcg by mouth every morning.      mupirocin (BACTROBAN) 2 % ointment Apply topically 2 (two) times daily.      oxyCODONE (ROXICODONE) 5 MG immediate release tablet Take 1 tablet (5 mg total) by mouth every 4 (four) hours as needed for Pain. 20 tablet 0    pantoprazole (PROTONIX) 40 MG tablet Take 40 mg by mouth every morning.      spironolactone (ALDACTONE) 25 MG tablet Take 25 mg by mouth once daily.      UNABLE TO FIND every 3 (three) months. Hormone pellets       No current facility-administered medications for this visit.       Review of patient's allergies indicates:  No Known Allergies     ROS  Per HPI    Well Woman   Pap:post hysterectomy  Mammo:none on file  Colonoscopy:2022 polyps/ diverticulosis per patient report  Dexa: n/a    Physical Exam  BP (!) 153/74 (BP Location: Left arm, Patient Position: Sitting, BP Method: Medium (Automatic))   Pulse (!) 59   Ht 5' 2" (1.575 m)   Wt 69.4 kg (153 lb)   BMI 27.98 kg/m²   General: alert and oriented, no acute distress  Respiratory: normal respiratory effort  Abd: soft, non-tender, " non-distended    Pelvic:  Ext. Genitalia: normal external genitalia. Normal bartholin's and skeens glands  Vagina: + atrophy. Normal vaginal mucosa without lesions. Scant white discharge noted.  Incisions healing well.  No mesh visible/ palpable Non-tender bladder base without palpable mass.  Cervix: absent  Uterus:  absent   Urethra: no masses or tenderness  Urethral meatus: no lesions, caruncle or prolapse.    POP-Q:  no pop with valsalva    Impression  1. Post-operative state        2. Vaginal atrophy        3. S/P laparoscopic sacrocolpopexy/perineorrhaphy/TVT-O/cystoscopy          We reviewed the above issues and discussed options for short-term versus long-term management of her problems.     Plan:   Post op healing well. Continue to follow post op instructions.  Ok to go to work 2 hours in AM an 2 hours in PM  Patient will follow up with us in 4 weeks    20 minutes were spent in face to face time with this patient  90 % of this time was spent in counseling and/or coordination of care    Tabitha Huerta, FNP-BC  [facility]  Division of Female Pelvic Medicine and Reconstructive Surgery  Department of Obstetrics & Gynecology

## 2024-01-18 ENCOUNTER — OFFICE VISIT (OUTPATIENT)
Dept: UROGYNECOLOGY | Facility: CLINIC | Age: 64
End: 2024-01-18
Payer: COMMERCIAL

## 2024-01-18 VITALS
WEIGHT: 153 LBS | SYSTOLIC BLOOD PRESSURE: 153 MMHG | HEART RATE: 59 BPM | BODY MASS INDEX: 28.16 KG/M2 | DIASTOLIC BLOOD PRESSURE: 74 MMHG | HEIGHT: 62 IN

## 2024-01-18 DIAGNOSIS — Z98.890 S/P SACROCOLPOPEXY: ICD-10-CM

## 2024-01-18 DIAGNOSIS — Z98.890 POST-OPERATIVE STATE: Primary | ICD-10-CM

## 2024-01-18 DIAGNOSIS — N95.2 VAGINAL ATROPHY: ICD-10-CM

## 2024-01-18 PROCEDURE — 1160F RVW MEDS BY RX/DR IN RCRD: CPT | Mod: CPTII,S$GLB,, | Performed by: NURSE PRACTITIONER

## 2024-01-18 PROCEDURE — 99024 POSTOP FOLLOW-UP VISIT: CPT | Mod: S$GLB,,, | Performed by: NURSE PRACTITIONER

## 2024-01-18 PROCEDURE — 1159F MED LIST DOCD IN RCRD: CPT | Mod: CPTII,S$GLB,, | Performed by: NURSE PRACTITIONER

## 2024-01-18 PROCEDURE — 3078F DIAST BP <80 MM HG: CPT | Mod: CPTII,S$GLB,, | Performed by: NURSE PRACTITIONER

## 2024-01-18 PROCEDURE — 99999 PR PBB SHADOW E&M-EST. PATIENT-LVL IV: CPT | Mod: PBBFAC,,, | Performed by: NURSE PRACTITIONER

## 2024-01-18 PROCEDURE — 3077F SYST BP >= 140 MM HG: CPT | Mod: CPTII,S$GLB,, | Performed by: NURSE PRACTITIONER

## 2024-01-18 NOTE — PATIENT INSTRUCTIONS
Post op healing well. Continue to follow post op instructions.  Ok to go to work 2 hours in AM an 2 hours in PM  Patient will follow up with us in 4 weeks

## 2024-01-19 ENCOUNTER — PATIENT MESSAGE (OUTPATIENT)
Dept: UROGYNECOLOGY | Facility: CLINIC | Age: 64
End: 2024-01-19
Payer: COMMERCIAL

## 2024-01-23 ENCOUNTER — PATIENT MESSAGE (OUTPATIENT)
Dept: UROGYNECOLOGY | Facility: CLINIC | Age: 64
End: 2024-01-23
Payer: COMMERCIAL

## 2024-01-26 ENCOUNTER — PATIENT MESSAGE (OUTPATIENT)
Dept: UROGYNECOLOGY | Facility: CLINIC | Age: 64
End: 2024-01-26
Payer: COMMERCIAL

## 2024-01-31 ENCOUNTER — PATIENT MESSAGE (OUTPATIENT)
Dept: UROGYNECOLOGY | Facility: CLINIC | Age: 64
End: 2024-01-31
Payer: COMMERCIAL

## 2024-02-06 ENCOUNTER — PATIENT MESSAGE (OUTPATIENT)
Dept: UROGYNECOLOGY | Facility: CLINIC | Age: 64
End: 2024-02-06
Payer: COMMERCIAL

## 2024-02-13 ENCOUNTER — PATIENT MESSAGE (OUTPATIENT)
Dept: UROGYNECOLOGY | Facility: CLINIC | Age: 64
End: 2024-02-13
Payer: COMMERCIAL

## 2024-02-14 ENCOUNTER — TELEPHONE (OUTPATIENT)
Dept: UROGYNECOLOGY | Facility: CLINIC | Age: 64
End: 2024-02-14
Payer: COMMERCIAL

## 2024-02-14 NOTE — TELEPHONE ENCOUNTER
----- Message from Garcia Rodríguez sent at 2/14/2024  9:24 AM CST -----  Name of Who is Calling:MARIAELENA MARS [[id number]]                What is the request in detail: Pt calling because she need to reschedule her post op. Because she would be out of town that day and she is asking for a call back..Please call back to further assist.                 Can the clinic reply by [facility]: No                What Number to Call Back [facility] not [facility]: [phone]

## 2024-02-20 ENCOUNTER — OFFICE VISIT (OUTPATIENT)
Dept: UROGYNECOLOGY | Facility: CLINIC | Age: 64
End: 2024-02-20
Payer: COMMERCIAL

## 2024-02-20 ENCOUNTER — TELEPHONE (OUTPATIENT)
Dept: UROGYNECOLOGY | Facility: CLINIC | Age: 64
End: 2024-02-20
Payer: COMMERCIAL

## 2024-02-20 VITALS
BODY MASS INDEX: 28.02 KG/M2 | WEIGHT: 153.25 LBS | DIASTOLIC BLOOD PRESSURE: 89 MMHG | SYSTOLIC BLOOD PRESSURE: 151 MMHG

## 2024-02-20 DIAGNOSIS — Z98.890 POST-OPERATIVE STATE: Primary | ICD-10-CM

## 2024-02-20 PROCEDURE — 3077F SYST BP >= 140 MM HG: CPT | Mod: CPTII,S$GLB,, | Performed by: OBSTETRICS & GYNECOLOGY

## 2024-02-20 PROCEDURE — 99999 PR PBB SHADOW E&M-EST. PATIENT-LVL II: CPT | Mod: PBBFAC,,, | Performed by: OBSTETRICS & GYNECOLOGY

## 2024-02-20 PROCEDURE — 99024 POSTOP FOLLOW-UP VISIT: CPT | Mod: S$GLB,,, | Performed by: OBSTETRICS & GYNECOLOGY

## 2024-02-20 PROCEDURE — 3079F DIAST BP 80-89 MM HG: CPT | Mod: CPTII,S$GLB,, | Performed by: OBSTETRICS & GYNECOLOGY

## 2024-02-20 RX ORDER — TERBINAFINE HYDROCHLORIDE 250 MG/1
250 TABLET ORAL
COMMUNITY
Start: 2024-02-07

## 2024-02-20 NOTE — LETTER
February 20, 2024    Nicolasa Bean  [address] [facility] [address]             [facility] Urogynecology  Urogynecology  [address] [place] [address]  Phone: [phone]   February 20, 2024     Patient: Nicolasa Bean   YOB: 1960   Date of Visit: 2/20/2024       To Whom it May Concern:    Nicolasa Bean was seen in my clinic on 2/20/2024. She may return to work on 2/20//2024  and may return with no restrictions.      If you have any questions or concerns, please don't hesitate to call.    Sincerely,         Glenna Miguel MD

## 2024-02-20 NOTE — PROGRESS NOTES
CC: Post-operative visit  L/S sacrocolpopexy, TVT-O, cystoscopy and perineorrhaphy on 24.     HPI:  Patient is a 63 y.o. female  presents today for a postoperative visit. She overall is doing well. She reports a little pain in the perineal region- one spot. Reports that if she does too much she develops a heaviness sensation in the lower abdomen. When she rests the symptoms improve. She denies vaginal heaviness, pressure or bulge. She denies urinary urgency or frequency. She has occasional urge incontinence if she waits too long but this is better than before. She denies leakage with coughing, sneezing or laughing. No vaginal bleeding for two weeks.   Takes Linzess. Also takes a glass of Miralax. She denies constipation.     REVIEW OF SYSTEMS:  Per HPI. All other reviewed systems are negative.        PHYSICAL EXAMINATION  BP (!) 151/89 (BP Location: Right arm, Patient Position: Sitting, BP Method: Medium (Automatic))   Wt 69.5 kg (153 lb 3.5 oz)   BMI 28.02 kg/m²     General: Healthy in appearance, Well nourished, Affect Normal, NAD.  Gastrointestinal: Non tender, Non distended, No masses, guarding or rebound, Incision: well healed, clean, dry and intact  Ext: No clubbing, cyanosis, edema or varicosities.    Genitourinary-  Vulva: normal without lesions, masses, atrophy or pain  Urethra: meatus central and normal in appearance, no prolapse/caruncle, no masses or discharge. Empty supine stress test was negative.   Bladder: non-tender, no masses  Vagina: No discharge or lesions, moderate atrophy, no masses appreciated, Mesh erosion: none. Small suture remnant noted at the introitus  [See POP-Q]  Cervix: absent  Uterus:  absent  Adnexa: non-palpable, non-tender      POP-Q Exam- pelvic organ prolapse quantitative    Aa -3  Anterior Wall Ba -3  Anterior wall C -10.5  Cervix or cuff   Gh 3.5    Genital hiatus pb 4    perineal body tvl 10.5    Total vaginal length   Ap -3  Posterior wall Bp -3  Posterior wall  D n/a  Posterior formix     without Uterus    No visits with results within 1 Day(s) from this visit.   Latest known visit with results is:   Hospital Outpatient Visit on 12/22/2023   Component Date Value Ref Range Status    Group & Rh 12/22/2023 O NEG   Final    Indirect Mahesh 12/22/2023 NEG   Final    Specimen Outdate 12/22/2023 01/05/2024 23:59   Final    Rh Type 12/22/2023 NEG   Final        ASSESSMENT & PLAN:    Post-operative state       62 yo s/p L/S sacrocolpopexy, TVT-O, cystoscopy and perineorrhaphy on 1/4/24. Patien tis overall doing well. Reviewed that some of the perineal pain is likely due to scar tissue and a small suture remnant. Reassured that it should improve with time. Discussed that he may resume all normal activities but to increase exercise slowly as she will be deconditioned due to lack of activity and exercise.   She will return for re-assessment in 4.5 months.   All questions were answered today. The patient was encouraged to contact the office as needed with any additional questions or concerns.     Total time spent on visit was 20 minutes.  This includes face to face time and non-face to face time preparing to see the patient (eg, review of tests), Obtaining and/or reviewing separately obtained history, Documenting clinical information in the electronic or other health record, Independently interpreting resultsand communicating results to the patient/family/caregiver, or Care coordination.      Glenna Miguel MD

## 2024-02-22 ENCOUNTER — PATIENT MESSAGE (OUTPATIENT)
Dept: UROGYNECOLOGY | Facility: CLINIC | Age: 64
End: 2024-02-22
Payer: COMMERCIAL

## 2024-03-06 ENCOUNTER — PATIENT MESSAGE (OUTPATIENT)
Dept: UROGYNECOLOGY | Facility: CLINIC | Age: 64
End: 2024-03-06
Payer: COMMERCIAL

## 2024-03-06 ENCOUNTER — TELEPHONE (OUTPATIENT)
Dept: UROGYNECOLOGY | Facility: CLINIC | Age: 64
End: 2024-03-06
Payer: COMMERCIAL

## 2024-03-06 DIAGNOSIS — R39.15 URINARY URGENCY: Primary | ICD-10-CM

## 2024-03-06 DIAGNOSIS — N94.10 DYSPAREUNIA IN FEMALE: ICD-10-CM

## 2024-03-06 DIAGNOSIS — M79.18 MYOFASCIAL PAIN: ICD-10-CM

## 2024-03-06 NOTE — TELEPHONE ENCOUNTER
Patient has noticed some right lower abdominal pain sine Saturday. Had a couple of episodes of pain Sunday and Monday. She reports she did have intercourse and it was very painful initially but then resolved to some discomfort. Still has some perineal discomfort. Has noticed some urinary urgency and then when she voids there is not much. She denies urinary incontinence. She denies dysuria.     Reviewed that she may have some myofascial tenderness that is contributing to symptoms. Will refer to PT.     Glenna Miguel MD

## 2024-05-23 ENCOUNTER — CLINICAL SUPPORT (OUTPATIENT)
Dept: REHABILITATION | Facility: OTHER | Age: 64
End: 2024-05-23
Attending: OBSTETRICS & GYNECOLOGY
Payer: COMMERCIAL

## 2024-05-23 DIAGNOSIS — M79.18 MYOFASCIAL PAIN: ICD-10-CM

## 2024-05-23 DIAGNOSIS — N94.10 DYSPAREUNIA IN FEMALE: ICD-10-CM

## 2024-05-23 DIAGNOSIS — R39.15 URINARY URGENCY: ICD-10-CM

## 2024-05-23 DIAGNOSIS — R27.8 COORDINATION IMPAIRMENT: ICD-10-CM

## 2024-05-23 DIAGNOSIS — M62.89 PELVIC FLOOR TENSION: Primary | ICD-10-CM

## 2024-05-23 PROCEDURE — 97162 PT EVAL MOD COMPLEX 30 MIN: CPT

## 2024-05-23 PROCEDURE — 97112 NEUROMUSCULAR REEDUCATION: CPT

## 2024-05-23 PROCEDURE — 97530 THERAPEUTIC ACTIVITIES: CPT

## 2024-06-05 NOTE — PROGRESS NOTES
Pelvic Health Physical Therapy   Treatment Note     Name: Nicolasa Rodríguez [facility] Number: [id number]    Therapy Diagnosis:   Encounter Diagnoses   Name Primary?    Pelvic pain Yes    Pelvic floor tension      Physician: Glenna Miguel MD    Visit Date: 6/6/2024    Physician Orders: PT Eval and Treat - Pelvic Floor PT   Medical Diagnosis from Referral:   R39.15 (ICD-10-CM) - Urinary urgency   M79.18 (ICD-10-CM) - Myofascial pain   N94.10 (ICD-10-CM) - Dyspareunia in female   Evaluation Date: 5/23/2024  Authorization Period Expiration: 12/31/2024  Plan of Care Expiration: 8/23/2024  Visit # / Visits authorized: 1/ 20    Cancelled Visits: 0  No Show Visits: 0    Time In: 1:40   Time Out: 2:30  Total Billable Time: 50 minutes    Precautions: Standard    Subjective     Pt reports: feels like relaxation prior to urination has helped with urination. Has been performing relaxation exercises though not consistently.     She was fairly compliant with home exercise program.  Response to previous treatment: good   Functional change: improved emptying with urination     Pain: 0/10  Location:       Objective     FOTO 1/3 - Most recently administered 5/23    Nicolasa received therapeutic exercises to develop  strength, endurance, ROM, flexibility, posture, and core stabilization for 00 minutes including:     Nicolasa received the following manual therapy techniques: to develop flexibility, extensibility, and desensitization for 15 minutes including: trigger point/myofascial release of pelvic floor muscles     [x] Sustained ischemic pressure to posterolateral PF on left - levator ani, obturator internus    + hip internal rotation    + single knee to chest     Nicolasa participated in neuromuscular re-education activities to develop Coordination, Control, and Down training for 30 minutes including:     [x] Diaphragmatic breathing review - in sitting, supine   [x] Diaphragmatic breathing + pelvic floor muscle relaxation    Focus on  posterior pelvic floor    [x] In supine + hip internal rotation, single knee to chest    [x] In sitting + hip internal rotation, single knee to chest    Abdominopelvic coordination +    [x] Side steps, monster walk   [x] Bridge   [x] Clamshell, hip abduction   [x] Side plank   Emphasis on complete relaxation of hip, abdominals after    Nicolasa participated in dynamic functional therapeutic activities to improve functional performance for 5  minutes, including:    [x] Modifications to home exercise program for improved carry over to intercourse        Home Exercises Provided and Patient Education Provided     Education provided:   - anatomy/physiology of pelvic floor, posture/body mechanices, diaphragmatic breathing, and behavior modifications  Discussed progression of plan of care with patient; educated pt in activity modification; reviewed HEP with pt. Pt demonstrated and verbalized understanding of all instruction and was provided with a handout of HEP (see Patient Instructions).    Written Home Exercises Provided: yes.  Exercises were reviewed and Nicolasa was able to demonstrate them prior to the end of the session.  Nicolasa demonstrated good  understanding of the education provided.     See EMR under Patient Instructions for exercises provided 6/6/2024.    Assessment     Pelvic floor muscles reassessed this session, with Nicolasa demonstrating continued left-side posterolateral PF tension. Despite this, improved relaxation noted with review of relaxation exercises as well as with co-contraction/co-relaxation of abdominals and posterior hip musculature. Based on performance, gentle strengthening exercise introduced to facilitate pelvic floor muscle relaxation via utilization of abdominal and hip coordination with pelvic floor. Plan to reassess utility next visit.    Nicolasa Is progressing well towards her goals.   Pt prognosis is Excellent.     Pt will continue to benefit from skilled outpatient physical therapy to address  the deficits listed in the problem list box on initial evaluation, provide pt/family education and to maximize pt's level of independence in the home and community environment.     Pt's spiritual, cultural and educational needs considered and pt agreeable to plan of care and goals.     Anticipated barriers to physical therapy: None     Goals:   Short Term Goals: 6 weeks - progressing as of 6/6/2024  Pt to demonstrate proper diaphragmatic breathing to help with calming the nervous system in order to decrease pain and to improve abdominal wall and pelvic floor musculature extensibility.   Pt to demonstrate good body mechanics when performing ADLs such as lifting and bending to decrease strain to lumbopelvic structures and reduce risk of further injury.  Pt to report a decrease in pain to no more than 3/10 at it's worst with vaginal intercourse.    Pt will report minimal to no pain with single digit pelvic assessment and display relaxation demonstrating improving pelvic floor muscle relaxation and coordination.      Long Term Goals: 12 weeks progressing as of 6/6/2024  Pt will be trained and compliant with postural strategies in sitting and standing to improve alignment and decrease pain and muscle fatigue  Pt to report being able to have a comfortable vaginal exam without significant increase in pelvic pain.  Pt to report a decrease in pain to no more than 1/10 at it's worst with vaginal intercourse.    Pt will be independent with use of dilation or pelvic wand use in order to progress towards self management and intercourse.    Plan     Plan for next visit: re-evaluate intercourse participation and PF mobility, review and progress home exercise program     STEVEN BRIONES, PT   07/18/2024

## 2024-06-06 ENCOUNTER — CLINICAL SUPPORT (OUTPATIENT)
Dept: REHABILITATION | Facility: OTHER | Age: 64
End: 2024-06-06
Payer: COMMERCIAL

## 2024-06-06 DIAGNOSIS — M62.89 PELVIC FLOOR TENSION: ICD-10-CM

## 2024-06-06 DIAGNOSIS — R10.2 PELVIC PAIN: Primary | ICD-10-CM

## 2024-06-06 PROBLEM — R27.8 COORDINATION IMPAIRMENT: Status: ACTIVE | Noted: 2024-06-06

## 2024-06-06 PROCEDURE — 97112 NEUROMUSCULAR REEDUCATION: CPT

## 2024-06-06 PROCEDURE — 97140 MANUAL THERAPY 1/> REGIONS: CPT

## 2024-06-21 ENCOUNTER — OFFICE VISIT (OUTPATIENT)
Dept: UROGYNECOLOGY | Facility: CLINIC | Age: 64
End: 2024-06-21
Payer: COMMERCIAL

## 2024-06-21 VITALS
SYSTOLIC BLOOD PRESSURE: 124 MMHG | BODY MASS INDEX: 25.55 KG/M2 | HEIGHT: 62 IN | WEIGHT: 138.88 LBS | HEART RATE: 74 BPM | DIASTOLIC BLOOD PRESSURE: 73 MMHG

## 2024-06-21 DIAGNOSIS — M79.18 MYOFASCIAL PAIN: Primary | ICD-10-CM

## 2024-06-21 DIAGNOSIS — Z98.890 S/P SACROCOLPOPEXY: ICD-10-CM

## 2024-06-21 PROCEDURE — 99999 PR PBB SHADOW E&M-EST. PATIENT-LVL III: CPT | Mod: PBBFAC,,, | Performed by: OBSTETRICS & GYNECOLOGY

## 2024-06-21 NOTE — PROGRESS NOTES
"CC: Post-operative visit  L/S sacrocolpopexy, TVT-O, cystoscopy and perineorrhaphy on 24.     HPI:  Patient is a 64 y.o. female  presents today for a follow up visit. She is doing well. She states that she is in PT for myofascial pain of the right side. She does feel that symptoms are getting better. Has seen Shantel Marshall and has had two visits so far. She has been sexually active with a little pain on the right side with entry. Also working on this during PT. Also working on relaxing the muscles to empty her bladder.     She denies vaginal prolapse symptoms. She denies vaginal heaviness, pressure or bulge. She reports urinary urgency. Denies frequency of urination. She has occasional urge incontinence but this is better than before. She denies leakage with coughing, sneezing or laughing.    Takes Linzess. Also takes a glass of Miralax. She denies constipation.     REVIEW OF SYSTEMS:  Per HPI. All other reviewed systems are negative.        PHYSICAL EXAMINATION  /73   Pulse 74   Ht 5' 2" (1.575 m)   Wt 63 kg (138 lb 14.2 oz)   BMI 25.40 kg/m²     General: Healthy in appearance, Well nourished, Affect Normal, NAD.  Gastrointestinal: Non tender, Non distended, No masses, guarding or rebound, Incision: well healed, clean, dry and intact  Ext: No clubbing, cyanosis, edema or varicosities.    Genitourinary-  Vulva: normal without lesions, masses, atrophy or pain  Urethra: meatus central and normal in appearance, no prolapse/caruncle, no masses or discharge. Empty supine stress test was negative.   Bladder: non-tender, no masses  Vagina: No discharge or lesions, moderate atrophy, no masses appreciated, Mesh erosion: none. Small suture remnant noted at the introitus  [See POP-Q]  Cervix: absent  Uterus:  absent  Adnexa: non-palpable, non-tender      POP-Q Exam- pelvic organ prolapse quantitative    Aa -3  Anterior Wall Ba -3  Anterior wall C -10.5  Cervix or cuff   Gh 4    Genital hiatus pb " 4    perineal body tvl 10.5    Total vaginal length   Ap -3  Posterior wall Bp -3  Posterior wall D n/a  Posterior formix     without Uterus    No visits with results within 1 Day(s) from this visit.   Latest known visit with results is:   Hospital Outpatient Visit on 12/22/2023   Component Date Value Ref Range Status    Group & Rh 12/22/2023 O NEG   Final    Indirect Mahesh 12/22/2023 NEG   Final    Specimen Outdate 12/22/2023 01/05/2024 23:59   Final    Rh Type 12/22/2023 NEG   Final        ASSESSMENT & PLAN:    Myofascial pain    S/P laparoscopic sacrocolpopexy/perineorrhaphy/TVT-O/cystoscopy         62 yo s/p L/S sacrocolpopexy, TVT-O, cystoscopy and perineorrhaphy on 1/4/24. Patient is overall doing well. She has some persistent deep levator tenderness on examination but no perineal pain. Symptoms have improved with time and through PT.   She will return for re-assessment in 6 months.   All questions were answered today. The patient was encouraged to contact the office as needed with any additional questions or concerns.     Total time spent on visit was 20 minutes.  This includes face to face time and non-face to face time preparing to see the patient (eg, review of tests), Obtaining and/or reviewing separately obtained history, Documenting clinical information in the electronic or other health record, Independently interpreting resultsand communicating results to the patient/family/caregiver, or Care coordination.      Glenna Miguel MD

## 2024-07-03 ENCOUNTER — PATIENT MESSAGE (OUTPATIENT)
Dept: REHABILITATION | Facility: OTHER | Age: 64
End: 2024-07-03
Payer: COMMERCIAL

## 2024-07-18 ENCOUNTER — CLINICAL SUPPORT (OUTPATIENT)
Dept: REHABILITATION | Facility: OTHER | Age: 64
End: 2024-07-18
Payer: COMMERCIAL

## 2024-07-18 DIAGNOSIS — M62.89 PELVIC FLOOR TENSION: Primary | ICD-10-CM

## 2024-07-18 DIAGNOSIS — R27.8 COORDINATION IMPAIRMENT: ICD-10-CM

## 2024-07-18 PROBLEM — R10.2 PELVIC PAIN: Status: ACTIVE | Noted: 2024-07-18

## 2024-07-18 PROCEDURE — 97530 THERAPEUTIC ACTIVITIES: CPT

## 2024-07-18 PROCEDURE — 97112 NEUROMUSCULAR REEDUCATION: CPT

## 2024-07-18 NOTE — PROGRESS NOTES
Pelvic Health Physical Therapy   Treatment Note     Name: Nicolasa Rodríguez [facility] Number: [id number]    Therapy Diagnosis:   Encounter Diagnoses   Name Primary?    Pelvic floor tension Yes    Coordination impairment      Physician: Glenna Miguel MD    Visit Date: 7/18/2024    Physician Orders: PT Eval and Treat - Pelvic Floor PT   Medical Diagnosis from Referral:   R39.15 (ICD-10-CM) - Urinary urgency   M79.18 (ICD-10-CM) - Myofascial pain   N94.10 (ICD-10-CM) - Dyspareunia in female   Evaluation Date: 5/23/2024  Authorization Period Expiration: 12/31/2024  Plan of Care Expiration: 8/23/2024  Visit # / Visits authorized: 2/ 20    Cancelled Visits: 0  No Show Visits: 0    Time In: 2:20   Time Out: 3:00   Total Billable Time: 40 minutes    Precautions: Standard    Subjective     Pt reports: has not been consistent with exercise use.    Pain on right side of vaginal canal has improved with intercourse, though pain noted with palpation during her appt with Dr. Miguel.   At this time, attendance has been limited by commute to therapy, and she feels independent with home exercise program management. She would like to discharge from therapy.     She was fairly compliant with home exercise program.  Response to previous treatment: good   Functional change: improved pain with intercourse     Pain: 0/10  Location:       Objective     FOTO 1/3 - Most recently administered 5/23    Nicolasa received therapeutic exercises to develop  strength, endurance, ROM, flexibility, posture, and core stabilization for 00 minutes including:     Nicolasa received the following manual therapy techniques: to develop flexibility, extensibility, and desensitization for 00 minutes including: trigger point/myofascial release of pelvic floor muscles     [] Sustained ischemic pressure to posterolateral PF on right - levator ani, obturator internus    + hip internal rotation    + single knee to chest     Nicolasa participated in neuromuscular re-education  activities to develop Coordination, Control, and Down training for 15 minutes including:     [x] Diaphragmatic breathing review - in sitting, supine   [x] Diaphragmatic breathing + pelvic floor muscle relaxation    Focus on posterior pelvic floor    [] In supine + hip internal rotation, single knee to chest    [x] In sitting + hip internal rotation, single knee to chest    Abdominopelvic coordination +    [] Side steps, monster walk   [] Bridge   [] Clamshell, hip abduction   [] Side plank   Emphasis on complete relaxation of hip, abdominals after    [x]  Review of Focused glute activation via posterior weight shift, weight in heel, improved trunk alignment, pause at eccentric max     Nicolasa participated in dynamic functional therapeutic activities to improve functional performance for 25 minutes, including:    [x] Modifications to home exercise program for improved carry over to intercourse     Independent progression of squat, deadlift, lunge series    Modifications for improved glute activation    Variations of UE lift, pull    Asymmetrical variation for improved core activation       Home Exercises Provided and Patient Education Provided     Education provided:   - anatomy/physiology of pelvic floor, posture/body mechanices, diaphragmatic breathing, and behavior modifications  Discussed progression of plan of care with patient; educated pt in activity modification; reviewed HEP with pt. Pt demonstrated and verbalized understanding of all instruction and was provided with a handout of HEP (see Patient Instructions).    Written Home Exercises Provided: yes.  Exercises were reviewed and Nicolasa was able to demonstrate them prior to the end of the session.  Nicolasa demonstrated good  understanding of the education provided.     See EMR under Patient Instructions for exercises provided 6/6/2024.    Assessment     Based on improvement in symptoms and expressed concerns about transportation to therapy, Nicolasa is appropriate  for discharge at this time. Though increased right obturator internus tension persists, she has demonstrated moderate improvement in mobility and tenderness. To date, she has achieved 3/4 STG and 1/4 LTG.    Nicolasa Is progressing well towards her goals.   Pt prognosis is Excellent.       Pt's spiritual, cultural and educational needs considered and pt agreeable to plan of care and goals.     Anticipated barriers to physical therapy: None     Goals:   Short Term Goals: 6 weeks - progressing as of 7/18/2024  Pt to demonstrate proper diaphragmatic breathing to help with calming the nervous system in order to decrease pain and to improve abdominal wall and pelvic floor musculature extensibility. - MET 7/18/2024  Pt to demonstrate good body mechanics when performing ADLs such as lifting and bending to decrease strain to lumbopelvic structures and reduce risk of further injury.- MET 7/18/2024  Pt to report a decrease in pain to no more than 3/10 at it's worst with vaginal intercourse.  - MET 7/18/2024  Pt will report minimal to no pain with single digit pelvic assessment and display relaxation demonstrating improving pelvic floor muscle relaxation and coordination. - NOT MET      Long Term Goals: 12 weeks progressing as of 7/18/2024  Pt will be trained and compliant with postural strategies in sitting and standing to improve alignment and decrease pain and muscle fatigue - MET 7/18/2024  Pt to report being able to have a comfortable vaginal exam without significant increase in pelvic pain. - NOT MET   Pt to report a decrease in pain to no more than 1/10 at it's worst with vaginal intercourse.   NOT MET   Pt will be independent with use of dilation or pelvic wand use in order to progress towards self management and intercourse. NOT MET     Plan     Discharge from PT     STEVEN BRIONES, PT   07/18/2024

## 2024-12-12 ENCOUNTER — OFFICE VISIT (OUTPATIENT)
Dept: UROGYNECOLOGY | Facility: CLINIC | Age: 64
End: 2024-12-12
Payer: COMMERCIAL

## 2024-12-12 VITALS
SYSTOLIC BLOOD PRESSURE: 156 MMHG | WEIGHT: 131.63 LBS | BODY MASS INDEX: 24.22 KG/M2 | HEIGHT: 62 IN | DIASTOLIC BLOOD PRESSURE: 70 MMHG | HEART RATE: 65 BPM

## 2024-12-12 DIAGNOSIS — N39.41 URGE INCONTINENCE OF URINE: ICD-10-CM

## 2024-12-12 DIAGNOSIS — N95.2 VAGINAL ATROPHY: Primary | ICD-10-CM

## 2024-12-12 PROCEDURE — 99213 OFFICE O/P EST LOW 20 MIN: CPT | Mod: S$GLB,,, | Performed by: OBSTETRICS & GYNECOLOGY

## 2024-12-12 PROCEDURE — 3077F SYST BP >= 140 MM HG: CPT | Mod: CPTII,S$GLB,, | Performed by: OBSTETRICS & GYNECOLOGY

## 2024-12-12 PROCEDURE — 99999 PR PBB SHADOW E&M-EST. PATIENT-LVL III: CPT | Mod: PBBFAC,,, | Performed by: OBSTETRICS & GYNECOLOGY

## 2024-12-12 PROCEDURE — 1160F RVW MEDS BY RX/DR IN RCRD: CPT | Mod: CPTII,S$GLB,, | Performed by: OBSTETRICS & GYNECOLOGY

## 2024-12-12 PROCEDURE — 3078F DIAST BP <80 MM HG: CPT | Mod: CPTII,S$GLB,, | Performed by: OBSTETRICS & GYNECOLOGY

## 2024-12-12 PROCEDURE — 1159F MED LIST DOCD IN RCRD: CPT | Mod: CPTII,S$GLB,, | Performed by: OBSTETRICS & GYNECOLOGY

## 2024-12-12 PROCEDURE — 3008F BODY MASS INDEX DOCD: CPT | Mod: CPTII,S$GLB,, | Performed by: OBSTETRICS & GYNECOLOGY

## 2024-12-12 RX ORDER — ESTRADIOL 0.1 MG/G
CREAM VAGINAL
Qty: 42.5 G | Refills: 3 | Status: SHIPPED | OUTPATIENT
Start: 2024-12-12

## 2024-12-12 NOTE — PROGRESS NOTES
"CC: Post-operative visit  L/S sacrocolpopexy, TVT-O, cystoscopy and perineorrhaphy on 24.     HPI:  Patient is a 64 y.o. female  presents today for a follow up visit.     Patient reports that she is doing pretty well. She states that she has been having some urge incontinence. She states that she almost cannot make it to the bathroom. Can leak a little before reaching the bathroom. She denies urinary frequency.   She is drinking 20 ounces of water in the morning within the hour as she takes Linzess for her constipation. Then she has one large cup of coffee. Will have an additional 4-5 cups of water     She reports that she did PT for dyspareunia. No longer experiencing dyspareunia.   She has a HEP but does them may be 1-2 times per week.     She denies loss of urine with a cough, sneeze or laugh. She denies prolapse symptoms.     REVIEW OF SYSTEMS:  Per HPI. All other reviewed systems are negative.        PHYSICAL EXAMINATION  BP (!) 156/70 (BP Location: Right arm, Patient Position: Sitting)   Pulse 65   Ht 5' 2" (1.575 m)   Wt 59.7 kg (131 lb 9.8 oz)   BMI 24.07 kg/m²     General: Healthy in appearance, Well nourished, Affect Normal, NAD.  Gastrointestinal: Non tender, Non distended, No masses, guarding or rebound, Incision: well healed, clean, dry and intact  Ext: No clubbing, cyanosis, edema or varicosities.    Genitourinary-  Vulva: normal without lesions, masses, atrophy or pain  Urethra: meatus central and normal in appearance, no prolapse/caruncle, no masses or discharge. Empty supine stress test was negative.   Bladder: non-tender, no masses  Vagina: No discharge or lesions, moderate atrophy, no masses appreciated, Mesh erosion: none.   [See POP-Q]  Cervix: absent  Uterus:  absent  Adnexa: non-palpable, non-tender    Levator tenderness: 0/10 bilaterally  Strength: 3/5      POP-Q Exam- pelvic organ prolapse quantitative    Aa -3  Anterior Wall Ba -3  Anterior wall C -10.5  Cervix or cuff   Gh " 4    Genital hiatus pb 4    perineal body tvl 10.5    Total vaginal length   Ap -3  Posterior wall Bp -3  Posterior wall D n/a  Posterior formix     without Uterus    No visits with results within 1 Day(s) from this visit.   Latest known visit with results is:   Hospital Outpatient Visit on 12/22/2023   Component Date Value Ref Range Status    Group & Rh 12/22/2023 O NEG   Final    Indirect Mahesh 12/22/2023 NEG   Final    Specimen Outdate 12/22/2023 01/05/2024 23:59   Final    Rh Type 12/22/2023 NEG   Final        ASSESSMENT & PLAN:    Vaginal atrophy  -     estradioL (ESTRACE) 0.01 % (0.1 mg/gram) vaginal cream; 0.5 grams with applicator or dime-sized amount with finger in vagina nightly x 2 weeks, then three times a week thereafter  Dispense: 42.5 g; Refill: 3    Urge incontinence of urine      65 yo s/p L/S sacrocolpopexy, TVT-O, cystoscopy and perineorrhaphy on 1/4/24. Patient is overall doing well. Her levator tenderness and dyspareunia have resolved. She has no prolapse or stress urinary incontinence symptoms. She is experiencing some urinary urgency and urgency urinary incontinence. Discussed treatment options including fluid titration and JABARI of menopause and how vaginal estrogen can help. Reviewed performing pelvic floor exercises as well. Patient has them from her prior PT visits but can refer back if desired. Will start with her HEP previously provided. Also discussed medication as an option and patient may consider if symptoms do not improve. Patient would like to start with pelvic floor exercises and vaginal estrogen cream. Script sent to her pharmacy and risks were reviewed with the patient. She will return for a virtual visit in about 4 months for re-evaluation of her urinary symptoms.     All questions were answered today. The patient was encouraged to contact the office as needed with any additional questions or concerns.     Total time spent on visit was 20 minutes.  This includes face to face  time and non-face to face time preparing to see the patient (eg, review of tests), Obtaining and/or reviewing separately obtained history, Documenting clinical information in the electronic or other health record, Independently interpreting resultsand communicating results to the patient/family/caregiver, or Care coordination.      Glenna Miguel MD

## 2025-01-14 DIAGNOSIS — N95.2 VAGINAL ATROPHY: ICD-10-CM

## 2025-01-14 RX ORDER — ESTRADIOL 0.1 MG/G
CREAM VAGINAL
Qty: 42.5 G | Refills: 3 | Status: SHIPPED | OUTPATIENT
Start: 2025-01-14

## 2025-03-10 ENCOUNTER — PATIENT MESSAGE (OUTPATIENT)
Dept: UROGYNECOLOGY | Facility: CLINIC | Age: 65
End: 2025-03-10
Payer: COMMERCIAL

## 2025-03-14 NOTE — PROGRESS NOTES
[facility] - UROGYNECOLOGY  [address] [address]  March 17, 2025     Nicolasa Bean  [id number]  1960    UROGYN POST-OP  3/17/2025     Nicolasa Bean is a 65 y.o. here for a urogyn followup foe vaginal dryness    OPERATIVE NOTE  Date of Procedure: 1/4/2024      Procedure: Procedure(s) (LRB):  SACROCOLPOPEXY, LAPAROSCOPIC (N/A)  SLING, MIDURETHRAL (N/A)  CYSTOSCOPY (N/A)  PERINEOPLASTY (N/A)       12/12/2024  Patient reports that she is doing pretty well. She states that she has been having some urge incontinence. She states that she almost cannot make it to the bathroom. Can leak a little before reaching the bathroom. She denies urinary frequency.   She is drinking 20 ounces of water in the morning within the hour as she takes Linzess for her constipation. Then she has one large cup of coffee. Will have an additional 4-5 cups of water      She reports that she did PT for dyspareunia. No longer experiencing dyspareunia.   She has a HEP but does them may be 1-2 times per week.      She denies loss of urine with a cough, sneeze or laugh. She denies prolapse symptoms.     Changes since last visit:  Feels like she has vaginal bulging on rectal side x 4 weeks. Had severe diarrhea---felt something pop  Lifting grandkids to hug, but not carrying them  Bladder issues: denies UI, urgency, or frequency. Using vaginal estrogen cream  Bowel issues: denies constipation or straining--taking linzess and miralax    Past Medical History:   Diagnosis Date    Diverticulitis     GERD (gastroesophageal reflux disease)     IBS (irritable bowel syndrome)        Past Surgical History:   Procedure Laterality Date    CYSTOSCOPY N/A 1/4/2024    Procedure: CYSTOSCOPY;  Surgeon: Glenna Miguel MD;  Location: [facility];  Service: OB/GYN;  Laterality: N/A;    HYSTERECTOMY  2004    with BSO    INSERTION OF MIDURETHRAL SLING N/A 1/4/2024    Procedure: SLING, MIDURETHRAL;  Surgeon: Glenna Miguel MD;   Location: [facility] OR;  Service: OB/GYN;  Laterality: N/A;  TVT-O    LAPAROSCOPIC SACROCOLPOPEXY N/A 1/4/2024    Procedure: SACROCOLPOPEXY, LAPAROSCOPIC;  Surgeon: Glenna Miguel MD;  Location: [facility] OR;  Service: OB/GYN;  Laterality: N/A;    PERINEOPLASTY N/A 1/4/2024    Procedure: PERINEOPLASTY;  Surgeon: Glenna Miguel MD;  Location: [facility] OR;  Service: OB/GYN;  Laterality: N/A;    TUBAL LIGATION      vaginal sling      for ZA       Family History   Problem Relation Name Age of Onset    Diabetes Mother      Hypertension Mother      Heart failure Father      Hypertension Sister      Hypertension Sister         Social History     Socioeconomic History    Marital status: Single   Occupational History    Occupation:    Tobacco Use    Smoking status: Never    Smokeless tobacco: Never   Substance and Sexual Activity    Alcohol use: Yes     Comment: occ    Drug use: Never    Sexual activity: Yes     Partners: Male   Social History Narrative    Lives with significant other, Carlton. Together since 2009. Feels safe in her home and relationship.      Social Drivers of Health     Financial Resource Strain: Low Risk  (9/11/2023)    Received from [facility]    Overall Financial Resource Strain (CARDIA)     Difficulty of Paying Living Expenses: Not hard at all   Food Insecurity: No Food Insecurity (9/11/2023)    Received from [facility]    Hunger Vital Sign     Worried About Running Out of Food in the Last Year: Never true     Ran Out of Food in the Last Year: Never true   Transportation Needs: No Transportation Needs (9/11/2023)    Received from [facility]    PRAPARE - Transportation     Lack of Transportation (Medical): No     Lack of Transportation (Non-Medical): No   Physical Activity: Sufficiently Active (9/11/2023)    Received from [facility]    Exercise Vital Sign     Days of Exercise per Week: 5 days     Minutes of Exercise per Session: 60 min   Stress: No Stress Concern Present (9/11/2023)    Received from  "[facility] [place] [facility] - Occupational Stress Questionnaire     Feeling of Stress : Not at all   Housing Stability: Low Risk  (9/11/2023)    Received from [facility]    Housing Stability Vital Sign     Unable to Pay for Housing in the Last Year: No     Number of Places Lived in the Last Year: 1     Unstable Housing in the Last Year: No       Current Medications[1]    Review of patient's allergies indicates:  No Known Allergies     ROS  Per HPI    Well Woman   Pap:post hysterectomy  Mammo:09/2024 normal  Colonoscopy:none on file  Dexa:none on file    Physical Exam  /66 (BP Location: Right arm, Patient Position: Sitting)   Pulse (!) 56   Ht 5' 2" (1.575 m)   Wt 57.1 kg (125 lb 14.1 oz)   BMI 23.02 kg/m²   General: alert and oriented, no acute distress  Respiratory: normal respiratory effort  Abd: soft, non-tender, non-distended  Pelvic:  Ext. Genitalia: normal external genitalia. Normal bartholin's and skeens glands  Vagina: + atrophy. Normal vaginal mucosa without lesions. No discharge noted.   Non-tender bladder base without palpable mass.  Cervix: absent  Uterus:  absent   Urethra: no masses or tenderness  Urethral meatus: no lesions, caruncle or prolapse.    POP-Q:no pop    Impression  1. S/P laparoscopic sacrocolpopexy/perineorrhaphy/TVT-O/cystoscopy        2. Vaginal atrophy          We reviewed the above issues and discussed options for short-term versus long-term management of her problems.     Plan:   No lifting > 50 pounds without assistance  Continue pelvic floor PT home exercise program  Continue linzess and miralax  Patient will follow up with us in 1 year      I spent a total of 20 minutes on the day of the visit.       Tabitha Huerta, FNP-BC  [facility]  Division of Female Pelvic Medicine and Reconstructive Surgery  Department of Obstetrics & Gynecology            [1]   Current Outpatient Medications   Medication Sig Dispense Refill    " cetirizine (ZYRTEC) 10 MG tablet Take 1 tablet by mouth once daily.      estradioL (ESTRACE) 0.01 % (0.1 mg/gram) vaginal cream 0.5 grams with applicator or dime-sized amount with finger in vagina nightly x 2 weeks, then three times a week thereafter 42.5 g 3    LINZESS 145 mcg Cap capsule Take 145 mcg by mouth every morning.      pantoprazole (PROTONIX) 40 MG tablet Take 40 mg by mouth every morning.      spironolactone (ALDACTONE) 25 MG tablet Take 25 mg by mouth once daily.      UNABLE TO FIND every 3 (three) months. Hormone pellets      ipratropium (ATROVENT) 42 mcg (0.06 %) nasal spray 2 sprays by Nasal route 3 (three) times daily as needed.      terbinafine HCL (LAMISIL) 250 mg tablet Take 250 mg by mouth.       No current facility-administered medications for this visit.

## 2025-03-17 ENCOUNTER — OFFICE VISIT (OUTPATIENT)
Dept: UROGYNECOLOGY | Facility: CLINIC | Age: 65
End: 2025-03-17
Payer: COMMERCIAL

## 2025-03-17 VITALS
HEART RATE: 56 BPM | DIASTOLIC BLOOD PRESSURE: 66 MMHG | BODY MASS INDEX: 23.17 KG/M2 | HEIGHT: 62 IN | SYSTOLIC BLOOD PRESSURE: 132 MMHG | WEIGHT: 125.88 LBS

## 2025-03-17 DIAGNOSIS — N95.2 VAGINAL ATROPHY: ICD-10-CM

## 2025-03-17 DIAGNOSIS — Z98.890 S/P SACROCOLPOPEXY: Primary | ICD-10-CM

## 2025-03-17 PROCEDURE — 3078F DIAST BP <80 MM HG: CPT | Mod: CPTII,S$GLB,, | Performed by: NURSE PRACTITIONER

## 2025-03-17 PROCEDURE — 1101F PT FALLS ASSESS-DOCD LE1/YR: CPT | Mod: CPTII,S$GLB,, | Performed by: NURSE PRACTITIONER

## 2025-03-17 PROCEDURE — 3288F FALL RISK ASSESSMENT DOCD: CPT | Mod: CPTII,S$GLB,, | Performed by: NURSE PRACTITIONER

## 2025-03-17 PROCEDURE — 99999 PR PBB SHADOW E&M-EST. PATIENT-LVL IV: CPT | Mod: PBBFAC,,, | Performed by: NURSE PRACTITIONER

## 2025-03-17 PROCEDURE — 3075F SYST BP GE 130 - 139MM HG: CPT | Mod: CPTII,S$GLB,, | Performed by: NURSE PRACTITIONER

## 2025-03-17 PROCEDURE — 3008F BODY MASS INDEX DOCD: CPT | Mod: CPTII,S$GLB,, | Performed by: NURSE PRACTITIONER

## 2025-03-17 PROCEDURE — 99213 OFFICE O/P EST LOW 20 MIN: CPT | Mod: S$GLB,,, | Performed by: NURSE PRACTITIONER

## 2025-03-17 PROCEDURE — 1159F MED LIST DOCD IN RCRD: CPT | Mod: CPTII,S$GLB,, | Performed by: NURSE PRACTITIONER

## 2025-03-17 PROCEDURE — 1160F RVW MEDS BY RX/DR IN RCRD: CPT | Mod: CPTII,S$GLB,, | Performed by: NURSE PRACTITIONER

## 2025-03-17 NOTE — PATIENT INSTRUCTIONS
No lifting > 50 pounds without assistance  Continue pelvic floor PT home exercise program  Continue linzess and miralax  Patient will follow up with us in 1 year

## 2025-05-06 ENCOUNTER — OFFICE VISIT (OUTPATIENT)
Dept: UROGYNECOLOGY | Facility: CLINIC | Age: 65
End: 2025-05-06
Payer: COMMERCIAL

## 2025-05-06 VITALS
SYSTOLIC BLOOD PRESSURE: 142 MMHG | HEIGHT: 62 IN | WEIGHT: 126.75 LBS | BODY MASS INDEX: 23.32 KG/M2 | DIASTOLIC BLOOD PRESSURE: 64 MMHG | HEART RATE: 63 BPM

## 2025-05-06 DIAGNOSIS — N81.6 RECTOCELE: Primary | ICD-10-CM

## 2025-05-06 PROCEDURE — 1101F PT FALLS ASSESS-DOCD LE1/YR: CPT | Mod: CPTII,S$GLB,, | Performed by: OBSTETRICS & GYNECOLOGY

## 2025-05-06 PROCEDURE — 3077F SYST BP >= 140 MM HG: CPT | Mod: CPTII,S$GLB,, | Performed by: OBSTETRICS & GYNECOLOGY

## 2025-05-06 PROCEDURE — 99999 PR PBB SHADOW E&M-EST. PATIENT-LVL III: CPT | Mod: PBBFAC,,, | Performed by: OBSTETRICS & GYNECOLOGY

## 2025-05-06 PROCEDURE — 99214 OFFICE O/P EST MOD 30 MIN: CPT | Mod: S$GLB,,, | Performed by: OBSTETRICS & GYNECOLOGY

## 2025-05-06 PROCEDURE — 1160F RVW MEDS BY RX/DR IN RCRD: CPT | Mod: CPTII,S$GLB,, | Performed by: OBSTETRICS & GYNECOLOGY

## 2025-05-06 PROCEDURE — 1159F MED LIST DOCD IN RCRD: CPT | Mod: CPTII,S$GLB,, | Performed by: OBSTETRICS & GYNECOLOGY

## 2025-05-06 PROCEDURE — 3008F BODY MASS INDEX DOCD: CPT | Mod: CPTII,S$GLB,, | Performed by: OBSTETRICS & GYNECOLOGY

## 2025-05-06 PROCEDURE — 3288F FALL RISK ASSESSMENT DOCD: CPT | Mod: CPTII,S$GLB,, | Performed by: OBSTETRICS & GYNECOLOGY

## 2025-05-06 PROCEDURE — 3078F DIAST BP <80 MM HG: CPT | Mod: CPTII,S$GLB,, | Performed by: OBSTETRICS & GYNECOLOGY

## 2025-05-06 NOTE — PROGRESS NOTES
"CC: Post-operative visit  L/S sacrocolpopexy, TVT-O, cystoscopy and perineorrhaphy on 24.     HPI:  Patient is a 65 y.o. female  presents today for a follow up visit.     Patient presents today stating that she "had a little incident" while traveling. She has a stomach issue where she had "really bad diarrhea". States that "something popped". Reports that it happened all of a sudden and the bulge was not present previously.   She states that she is aware that it is present when she wipes. She denies vaginal heaviness or pressure. She does not usually need to splint but when she is traveling and more constipated she may insert a finger to push.     She continues to take Linzess and Miralax daily.     She reports otherwise doing well. She occasionally has urinary urgency and urgency urinary incontinence. Reports that this is rare. She denies loss of urine with a cough, sneeze or laugh.         REVIEW OF SYSTEMS:  Per HPI. All other reviewed systems are negative.        PHYSICAL EXAMINATION  BP (!) 142/64 (BP Location: Right arm, Patient Position: Sitting)   Pulse 63   Ht 5' 2" (1.575 m)   Wt 57.5 kg (126 lb 12.2 oz)   BMI 23.19 kg/m²     General: Healthy in appearance, Well nourished, Affect Normal, NAD.    Ext: No clubbing, cyanosis, edema or varicosities.    Genitourinary-  Vulva: normal without lesions, masses, atrophy or pain  Urethra: meatus central and normal in appearance, no prolapse/caruncle, no masses or discharge. Empty supine stress test was negative.   Bladder: non-tender, no masses  Vagina: No discharge or lesions, moderate atrophy, no masses appreciated, Mesh erosion: none.   [See POP-Q]  Cervix: absent  Uterus:  absent  Adnexa: non-palpable, non-tender      POP-Q Exam- pelvic organ prolapse quantitative    Aa -3  Anterior Wall Ba -3  Anterior wall C -9  Cervix or cuff   Gh 4    Genital hiatus pb 4    perineal body tvl 9    Total vaginal length   Ap -0.5  Posterior wall Bp -0.5  Posterior " wall D n/a  Posterior formix     without Uterus    No visits with results within 1 Day(s) from this visit.   Latest known visit with results is:   Hospital Outpatient Visit on 12/22/2023   Component Date Value Ref Range Status    Group & Rh 12/22/2023 O NEG   Final    Indirect Mahesh 12/22/2023 NEG   Final    Specimen Outdate 12/22/2023 01/05/2024 23:59   Final    Rh Type 12/22/2023 NEG   Final        ASSESSMENT & PLAN:    Rectocele      66 yo s/p L/S sacrocolpopexy, TVT-O, cystoscopy and perineorrhaphy on 1/4/24.     Patient has distal posterior vaginal prolapse since an episode of severe diarrhea. Now needing to splint. Reviewed that we can surgically correct with a posterior repair and perineorrhaphy. Patient would like to have it corrected as she feels that it is interfering with her quality of life.     We reviewed the risks and benefits of the planned surgical procedure which included but were not limited to pelvic pain, pain with intercourse, infection, bleeding or hematoma, need for transfusion, risks of transfusion, injury to bowel, urinary tract, blood vessels or nerves, urinary retention, prolonged catheterization, no change in symptoms, recurrence of prolapse, fistula formation, need for ostomy (colostomy/ileostomy/ nephrostomy/ ileostomy) and need for further surgery. Patient was provided the opportunity to ask questions, All questions were answered. The informed consent was signed and a copy was provided to the patient.     All questions were answered today. The patient was encouraged to contact the office as needed with any additional questions or concerns.     Total time spent on visit was 30 minutes.  This includes face to face time and non-face to face time preparing to see the patient (eg, review of tests), Obtaining and/or reviewing separately obtained history, Documenting clinical information in the electronic or other health record, Independently interpreting resultsand communicating results to  the patient/family/caregiver, or Care coordination.      Glenna Miguel MD

## 2025-05-07 ENCOUNTER — PATIENT MESSAGE (OUTPATIENT)
Dept: UROGYNECOLOGY | Facility: CLINIC | Age: 65
End: 2025-05-07
Payer: COMMERCIAL

## 2025-05-07 DIAGNOSIS — N81.6 RECTOCELE: Primary | ICD-10-CM

## 2025-08-19 ENCOUNTER — PATIENT MESSAGE (OUTPATIENT)
Dept: UROGYNECOLOGY | Facility: CLINIC | Age: 65
End: 2025-08-19
Payer: COMMERCIAL

## (undated) DEVICE — PACK LAPAROSCOPY BAPTIST

## (undated) DEVICE — DRAPE STERI INSTRUMENT 1018

## (undated) DEVICE — ELECTRODE REM PLYHSV RETURN 9

## (undated) DEVICE — SUT CV-2 THX-26 EPTFE

## (undated) DEVICE — SOL POVIDONE PREP IODINE 4 OZ

## (undated) DEVICE — SYR B-D DISP CONTROL 10CC100/C

## (undated) DEVICE — PORT AIRSEAL 12/120MM LPI

## (undated) DEVICE — SUT VICRYL 3-0 27 SH

## (undated) DEVICE — SUT VICRYL PLUS 4-0 P3 18IN

## (undated) DEVICE — GLOVE SENSICARE PI GRN 6.5

## (undated) DEVICE — IRRIGATOR ENDOSCOPY DISP.

## (undated) DEVICE — SOL NORMAL USPCA 0.9%

## (undated) DEVICE — SUT VICRYL+ 27 UR-6 VIOL

## (undated) DEVICE — SYR IRRIGATION BULB STER 60ML

## (undated) DEVICE — SOL IRR SOD CHL .9% POUR

## (undated) DEVICE — JELLY SURGILUBE 5GR

## (undated) DEVICE — STRIP MEDI WND CLSR 1/2X4IN

## (undated) DEVICE — CANNULA ENDOPATH XCEL 5X100MM

## (undated) DEVICE — SUT MONOCRYL 2-0 VIOL 27IN

## (undated) DEVICE — TOWEL OR DISP STRL BLUE 4/PK

## (undated) DEVICE — NDL HYPO REG 25G X 1 1/2

## (undated) DEVICE — KIT WING PAD POSITIONING

## (undated) DEVICE — TRAY DRY SKIN SCRUB PREP

## (undated) DEVICE — DRESSING GZ SURGICOUNT 4X8

## (undated) DEVICE — BRIEF MESH LARGE

## (undated) DEVICE — SYR 10CC LUER LOCK

## (undated) DEVICE — SYR 50CC LL

## (undated) DEVICE — SOL NACL IRR 1000ML BTL

## (undated) DEVICE — Device

## (undated) DEVICE — GOWN ECLIPSE REINF LVL4 TWL LG

## (undated) DEVICE — SPONGE LAP 18X18 PREWASHED

## (undated) DEVICE — BLADE SURG STAINLESS STEEL #15

## (undated) DEVICE — SUT VICRYL PLUS 0 CT1 36IN

## (undated) DEVICE — STRIP MEDI WND CLSR 1/4X4IN

## (undated) DEVICE — SYS SEE SHARP SCP ANTIFG LNG

## (undated) DEVICE — DRAPE LAVH LAPAROSCOPY W/FLUID

## (undated) DEVICE — GOWN NONREINF SET-IN SLV XL

## (undated) DEVICE — SUT MCRYL PLUS 4-0 PS2 27IN

## (undated) DEVICE — SYR 50ML CATH TIP

## (undated) DEVICE — TRAY CATH 1-LYR URIMTR 16FR

## (undated) DEVICE — SET CYSTO IRR DRP CHMBR 84IN

## (undated) DEVICE — CLIPPER BLADE MOD 4406 (CAREF)

## (undated) DEVICE — SET TRI-LUMEN FILTERED TUBE

## (undated) DEVICE — TROCAR ENDOPATH XCEL 5X100MM

## (undated) DEVICE — SOL POVIDONE SCRUB IODINE 4 OZ

## (undated) DEVICE — GLOVE SENSICARE PI SURG 7

## (undated) DEVICE — CONTAINER SPEC OR STRL 4.5OZ

## (undated) DEVICE — PAD PREP CUFFED NS 24X48IN

## (undated) DEVICE — SUT VICRYL 2-0 CT-2 VCP269H

## (undated) DEVICE — ELECTRODE NEEDLE 1IN

## (undated) DEVICE — GLOVE SENSICARE PI SURG 6.5